# Patient Record
Sex: FEMALE | Race: WHITE | NOT HISPANIC OR LATINO | Employment: FULL TIME | URBAN - METROPOLITAN AREA
[De-identification: names, ages, dates, MRNs, and addresses within clinical notes are randomized per-mention and may not be internally consistent; named-entity substitution may affect disease eponyms.]

---

## 2018-10-21 ENCOUNTER — APPOINTMENT (EMERGENCY)
Dept: RADIOLOGY | Facility: HOSPITAL | Age: 48
End: 2018-10-21
Payer: COMMERCIAL

## 2018-10-21 ENCOUNTER — HOSPITAL ENCOUNTER (EMERGENCY)
Facility: HOSPITAL | Age: 48
Discharge: HOME | End: 2018-10-21
Attending: EMERGENCY MEDICINE | Admitting: EMERGENCY MEDICINE
Payer: COMMERCIAL

## 2018-10-21 VITALS
SYSTOLIC BLOOD PRESSURE: 140 MMHG | HEART RATE: 76 BPM | HEIGHT: 65 IN | RESPIRATION RATE: 18 BRPM | WEIGHT: 160 LBS | BODY MASS INDEX: 26.66 KG/M2 | DIASTOLIC BLOOD PRESSURE: 66 MMHG | OXYGEN SATURATION: 95 % | TEMPERATURE: 97.7 F

## 2018-10-21 DIAGNOSIS — B34.9 VIRAL ILLNESS: Primary | ICD-10-CM

## 2018-10-21 LAB
ANION GAP SERPL CALCULATED.3IONS-SCNC: 8 MMOL/L (ref 4–13)
BASOPHILS # BLD AUTO: 0 THOUSANDS/ΜL (ref 0–0.1)
BASOPHILS NFR BLD AUTO: 0 % (ref 0–2)
BUN SERPL-MCNC: 16 MG/DL (ref 7–25)
CALCIUM SERPL-MCNC: 9.7 MG/DL (ref 8.6–10.5)
CHLORIDE SERPL-SCNC: 106 MMOL/L (ref 98–107)
CO2 SERPL-SCNC: 26 MMOL/L (ref 21–31)
CREAT SERPL-MCNC: 0.9 MG/DL (ref 0.6–1.2)
EOSINOPHIL # BLD AUTO: 0.1 THOUSAND/ΜL (ref 0–0.61)
EOSINOPHIL NFR BLD AUTO: 2 % (ref 0–5)
ERYTHROCYTE [DISTWIDTH] IN BLOOD BY AUTOMATED COUNT: 13.1 % (ref 11.5–14.5)
ERYTHROCYTE [SEDIMENTATION RATE] IN BLOOD: 17 MM/HOUR (ref 0–20)
FLUAV AG SPEC QL IA: NEGATIVE
FLUBV AG SPEC QL IA: NEGATIVE
GFR SERPL CREATININE-BSD FRML MDRD: 75 ML/MIN/1.73SQ M
GLUCOSE SERPL-MCNC: 180 MG/DL (ref 65–99)
HCT VFR BLD AUTO: 43.8 % (ref 34.8–46.1)
HGB BLD-MCNC: 14.9 G/DL (ref 12–16)
LYMPHOCYTES # BLD AUTO: 1.7 THOUSANDS/ΜL (ref 0.6–4.47)
LYMPHOCYTES NFR BLD AUTO: 32 % (ref 21–51)
MCH RBC QN AUTO: 31.9 PG (ref 26–34)
MCHC RBC AUTO-ENTMCNC: 34 G/DL (ref 31–37)
MCV RBC AUTO: 94 FL (ref 81–99)
MONOCYTES # BLD AUTO: 0.3 THOUSAND/ΜL (ref 0.17–1.22)
MONOCYTES NFR BLD AUTO: 6 % (ref 2–12)
NEUTROPHILS # BLD AUTO: 3.2 THOUSANDS/ΜL (ref 1.4–6.5)
NEUTS SEG NFR BLD AUTO: 60 % (ref 42–75)
NRBC BLD AUTO-RTO: 0 /100 WBCS
PLATELET # BLD AUTO: 178 THOUSANDS/UL (ref 149–390)
PMV BLD AUTO: 9.2 FL (ref 8.6–11.7)
POTASSIUM SERPL-SCNC: 4.1 MMOL/L (ref 3.5–5.5)
RBC # BLD AUTO: 4.67 MILLION/UL (ref 3.9–5.2)
S PYO AG THROAT QL: NEGATIVE
SODIUM SERPL-SCNC: 140 MMOL/L (ref 134–143)
WBC # BLD AUTO: 5.4 THOUSAND/UL (ref 4.8–10.8)

## 2018-10-21 PROCEDURE — 85652 RBC SED RATE AUTOMATED: CPT | Performed by: EMERGENCY MEDICINE

## 2018-10-21 PROCEDURE — 85025 COMPLETE CBC W/AUTO DIFF WBC: CPT | Performed by: EMERGENCY MEDICINE

## 2018-10-21 PROCEDURE — 86140 C-REACTIVE PROTEIN: CPT | Performed by: EMERGENCY MEDICINE

## 2018-10-21 PROCEDURE — 87430 STREP A AG IA: CPT | Performed by: EMERGENCY MEDICINE

## 2018-10-21 PROCEDURE — 80048 BASIC METABOLIC PNL TOTAL CA: CPT | Performed by: EMERGENCY MEDICINE

## 2018-10-21 PROCEDURE — 99283 EMERGENCY DEPT VISIT LOW MDM: CPT

## 2018-10-21 PROCEDURE — 87631 RESP VIRUS 3-5 TARGETS: CPT | Performed by: EMERGENCY MEDICINE

## 2018-10-21 PROCEDURE — 71046 X-RAY EXAM CHEST 2 VIEWS: CPT

## 2018-10-21 PROCEDURE — 36415 COLL VENOUS BLD VENIPUNCTURE: CPT | Performed by: EMERGENCY MEDICINE

## 2018-10-21 RX ORDER — IBUPROFEN 600 MG/1
600 TABLET ORAL ONCE
Status: COMPLETED | OUTPATIENT
Start: 2018-10-21 | End: 2018-10-21

## 2018-10-21 RX ORDER — TRAMADOL HYDROCHLORIDE 50 MG/1
50 TABLET ORAL EVERY 6 HOURS PRN
Qty: 10 TABLET | Refills: 0 | Status: SHIPPED | OUTPATIENT
Start: 2018-10-21 | End: 2020-11-28 | Stop reason: HOSPADM

## 2018-10-21 RX ORDER — TRAMADOL HYDROCHLORIDE 50 MG/1
50 TABLET ORAL ONCE
Status: COMPLETED | OUTPATIENT
Start: 2018-10-21 | End: 2018-10-21

## 2018-10-21 RX ADMIN — TRAMADOL HYDROCHLORIDE 50 MG: 50 TABLET, FILM COATED ORAL at 23:24

## 2018-10-21 RX ADMIN — IBUPROFEN 600 MG: 600 TABLET ORAL at 21:22

## 2018-10-22 LAB
CRP SERPL QL: 20.8 MG/L
FLUAV AG SPEC QL: NORMAL
FLUBV AG SPEC QL: NORMAL
RSV B RNA SPEC QL NAA+PROBE: NORMAL

## 2018-10-22 NOTE — DISCHARGE INSTRUCTIONS
Viral Syndrome, Ambulatory Care   GENERAL INFORMATION:   Viral syndrome  is a term healthcare providers use for general symptoms of a viral infection that has no clear cause  Common symptoms include the following:   · Fever and chills, or a rash    · Runny or stuffy nose     · Cough, sore throat, or hoarseness     · Headache, or pain and pressure around your eyes     · Muscle aches and joint pain     · Shortness of breath or wheezing     · Abdominal pain, cramps, and diarrhea     · Nausea, vomiting, or loss of appetite  Seek immediate care for the following symptoms:   · Continued vomiting and diarrhea    · Chest pain or trouble breathing  Treatment for a viral syndrome  may include medicines to decrease fever, cough, or stuffy nose  You may also need medicines to relieve a rash, itching, or help treat the viral infection  Manage your symptoms:  Drink liquids as directed to help prevent dehydration  Ask how much liquid to drink each day and which liquids are best for you  You may need to drink an oral rehydration solution (ORS)  An ORS has the right amounts of water, salts, and sugar you need to replace body fluids  Prevent the spread of viral syndrome:   · Wash your hands often  Use soap and water  Wash your hands after you use the bathroom, change a child's diapers, or sneeze  Wash your hands before you prepare or eat food  Use a gel hand  if soap and water are not available  · Wear a mask  to help prevent spreading the virus to others  · Cook and handle food properly  Cook food completely through  Clean food preparation surfaces with a disinfectant  · Ask about vaccinations  You may need an influenza (flu) vaccine, pneumococcal vaccine, or meningococcal vaccine  These vaccines help prevent the flu, pneumonia, and meningococcal disease  Follow up with your healthcare provider as directed:  Write down your questions so you remember to ask them during your visits     CARE AGREEMENT:   You have the right to help plan your care  Learn about your health condition and how it may be treated  Discuss treatment options with your caregivers to decide what care you want to receive  You always have the right to refuse treatment  The above information is an  only  It is not intended as medical advice for individual conditions or treatments  Talk to your doctor, nurse or pharmacist before following any medical regimen to see if it is safe and effective for you  © 2014 [address] is for End User's use only and may not be sold, redistributed or otherwise used for commercial purposes  All illustrations and images included in CareNotes® are the copyrighted property of [employer]  or Timothy Hendrickson

## 2018-10-22 NOTE — ED PROVIDER NOTES
History  Chief Complaint   Patient presents with    Sore Throat    Mouth Lesions    Rash     Pt states since Friday she has had a sore throat that feels like she swallowed boiling water, a rash on her hands that burns, and blisters in the corners of her mouth  46YEAR-OLD FEMALE WITH NO HISTORY OF DISEASE PRESENTS TO THE EMERGENCY DEPARTMENT WITH THE SORE THROAT, REPORTED SORES IN HER MOUTH, MUSCLE WEAKNESS AND OVERALL SENSE OF MALAISE AND FATIGUE  STARTED 3 DAYS AGO AND SEEMS OF GOTTEN WORSE FOR THE PATIENT TODAY  SHE HAS HAD A COUGH AND OCCASIONAL DIARRHEA ASSOCIATED WITH NAUSEA  NO CHANGE IN BOWEL OR BLADDER HABITS  NO HEADACHES DIZZINESS NO REPORTED SHORTNESS OF BREATH  None       History reviewed  No pertinent past medical history  Past Surgical History:   Procedure Laterality Date    APPENDECTOMY         History reviewed  No pertinent family history  I have reviewed and agree with the history as documented  Social History   Substance Use Topics    Smoking status: Never Smoker    Smokeless tobacco: Never Used    Alcohol use No        Review of Systems   Constitutional: Negative for chills and fever  HENT: Negative for ear pain, rhinorrhea and sore throat  Eyes: Negative for pain, redness and visual disturbance  Respiratory: Negative for cough and shortness of breath  Cardiovascular: Negative for chest pain and leg swelling  Gastrointestinal: Negative for abdominal pain, diarrhea, nausea and vomiting  Genitourinary: Negative for dysuria, flank pain, frequency and urgency  Musculoskeletal: Negative for back pain, myalgias and neck pain  Skin: Negative for rash  Neurological: Negative for dizziness, weakness, light-headedness and headaches  Hematological: Negative  Psychiatric/Behavioral: Negative for agitation, confusion and suicidal ideas  The patient is not nervous/anxious  All other systems reviewed and are negative        Physical Exam  Physical Exam Constitutional: She is oriented to person, place, and time  She appears well-developed and well-nourished  HENT:   Nose: Nose normal    Mouth/Throat: Oropharynx is clear and moist  No oropharyngeal exudate  MINOR PHARYNGEAL ERYTHEMA   Eyes: Pupils are equal, round, and reactive to light  Conjunctivae and EOM are normal  No scleral icterus  Neck: Normal range of motion  Neck supple  No JVD present  No tracheal deviation present  Cardiovascular: Normal rate, regular rhythm and normal heart sounds  No murmur heard  Pulmonary/Chest: Effort normal and breath sounds normal  No respiratory distress  She has no wheezes  She has no rales  Abdominal: Soft  Bowel sounds are normal  There is no tenderness  There is no guarding  Musculoskeletal: Normal range of motion  She exhibits no edema or tenderness  Neurological: She is alert and oriented to person, place, and time  No cranial nerve deficit or sensory deficit  She exhibits normal muscle tone  5/5 motor, nl sens   Skin: Skin is warm and dry  There is erythema (MINOR ERYTHEMA TO THE PALMS OF THE HANDS WITH FAKE SPOTTING )  Psychiatric: She has a normal mood and affect  Her behavior is normal    Nursing note and vitals reviewed        Vital Signs  ED Triage Vitals [10/21/18 2047]   Temperature Pulse Respirations Blood Pressure SpO2   97 7 °F (36 5 °C) 76 18 140/66 95 %      Temp Source Heart Rate Source Patient Position - Orthostatic VS BP Location FiO2 (%)   Temporal Monitor -- Left arm --      Pain Score       7           Vitals:    10/21/18 2047   BP: 140/66   Pulse: 76       Visual Acuity      ED Medications  Medications   ibuprofen (MOTRIN) tablet 600 mg (not administered)       Diagnostic Studies  Results Reviewed     Procedure Component Value Units Date/Time    Rapid Strep A Screen Only, Adults [[id number]]     Lab Status:  No result Specimen:  Throat from Throat     Rapid Influenza Screen with Reflex PCR (indicated for patients <2 mo of age) [[id number]]     Lab Status:  No result Specimen:  Nasopharyngeal from Nasopharyngeal Swab     CBC and differential [[id number]]     Lab Status:  No result Specimen:  Blood     Basic metabolic panel [[id number]]     Lab Status:  No result Specimen:  Blood     C-reactive protein [[id number]]     Lab Status:  No result Specimen:  Blood     Sedimentation rate, automated [[id number]]     Lab Status:  No result Specimen:  Blood                  XR chest 2 views    (Results Pending)              Procedures  Procedures       Phone Contacts  ED Phone Contact    ED Course                               [facility]  CritCare Time    Disposition  Final diagnoses:   None     ED Disposition     None      Follow-up Information    None         Patient's Medications    No medications on file     No discharge procedures on file      ED Provider  Electronically Signed by           Rupesh Edwards MD  10/21/18 2122       Rupesh Edwards MD  10/22/18 9725

## 2020-11-25 ENCOUNTER — HOSPITAL ENCOUNTER (INPATIENT)
Facility: HOSPITAL | Age: 50
LOS: 3 days | Discharge: HOME | DRG: 195 | End: 2020-11-28
Attending: EMERGENCY MEDICINE | Admitting: HOSPITALIST
Payer: COMMERCIAL

## 2020-11-25 ENCOUNTER — APPOINTMENT (EMERGENCY)
Dept: RADIOLOGY | Facility: HOSPITAL | Age: 50
DRG: 195 | End: 2020-11-25
Payer: COMMERCIAL

## 2020-11-25 ENCOUNTER — APPOINTMENT (EMERGENCY)
Dept: CT IMAGING | Facility: HOSPITAL | Age: 50
DRG: 195 | End: 2020-11-25
Payer: COMMERCIAL

## 2020-11-25 DIAGNOSIS — J18.9 PNEUMONIA OF BOTH LOWER LOBES DUE TO INFECTIOUS ORGANISM: ICD-10-CM

## 2020-11-25 DIAGNOSIS — I27.20 PULMONARY HYPERTENSION (HCC): ICD-10-CM

## 2020-11-25 DIAGNOSIS — J18.9 PNEUMONIA: Primary | ICD-10-CM

## 2020-11-25 PROBLEM — R65.20 SIRS DUE TO INFECTIOUS PROCESS WITH ORGAN DYSFUNCTION (HCC): Status: ACTIVE | Noted: 2020-11-25

## 2020-11-25 PROBLEM — U07.1 COVID-19 DETERMINED BY CLINICAL DIAGNOSTIC CRITERIA: Status: ACTIVE | Noted: 2020-11-25

## 2020-11-25 PROBLEM — A41.9 SIRS DUE TO INFECTIOUS PROCESS WITH ORGAN DYSFUNCTION (HCC): Status: ACTIVE | Noted: 2020-11-25

## 2020-11-25 LAB
ALBUMIN SERPL BCP-MCNC: 4 G/DL (ref 3.5–5.7)
ALP SERPL-CCNC: 65 U/L (ref 40–150)
ALT SERPL W P-5'-P-CCNC: 44 U/L (ref 7–52)
ANION GAP SERPL CALCULATED.3IONS-SCNC: 11 MMOL/L (ref 4–13)
APTT PPP: 33 SECONDS (ref 23–37)
AST SERPL W P-5'-P-CCNC: 46 U/L (ref 13–39)
BASOPHILS # BLD AUTO: 0 THOUSANDS/ΜL (ref 0–0.1)
BASOPHILS NFR BLD AUTO: 0 % (ref 0–2)
BILIRUB SERPL-MCNC: 0.6 MG/DL (ref 0.2–1)
BUN SERPL-MCNC: 15 MG/DL (ref 7–25)
CALCIUM SERPL-MCNC: 8.5 MG/DL (ref 8.6–10.5)
CHLORIDE SERPL-SCNC: 101 MMOL/L (ref 98–107)
CO2 SERPL-SCNC: 25 MMOL/L (ref 21–31)
CREAT SERPL-MCNC: 0.77 MG/DL (ref 0.6–1.2)
D DIMER PPP FEU-MCNC: 2.02 UG/ML FEU
EOSINOPHIL # BLD AUTO: 0 THOUSAND/ΜL (ref 0–0.61)
EOSINOPHIL NFR BLD AUTO: 0 % (ref 0–5)
ERYTHROCYTE [DISTWIDTH] IN BLOOD BY AUTOMATED COUNT: 12.5 % (ref 11.5–14.5)
FLUAV RNA RESP QL NAA+PROBE: NEGATIVE
FLUBV RNA RESP QL NAA+PROBE: NEGATIVE
GFR SERPL CREATININE-BSD FRML MDRD: 89 ML/MIN/1.73SQ M
GLUCOSE SERPL-MCNC: 135 MG/DL (ref 65–99)
HCT VFR BLD AUTO: 45.6 % (ref 42–47)
HGB BLD-MCNC: 16 G/DL (ref 12–16)
INR PPP: 0.98 (ref 0.84–1.19)
LACTATE SERPL-SCNC: 0.9 MMOL/L (ref 0.5–2)
LYMPHOCYTES # BLD AUTO: 0.8 THOUSANDS/ΜL (ref 0.6–4.47)
LYMPHOCYTES NFR BLD AUTO: 19 % (ref 21–51)
MCH RBC QN AUTO: 31.9 PG (ref 26–34)
MCHC RBC AUTO-ENTMCNC: 35.1 G/DL (ref 31–37)
MCV RBC AUTO: 91 FL (ref 81–99)
MONOCYTES # BLD AUTO: 0.2 THOUSAND/ΜL (ref 0.17–1.22)
MONOCYTES NFR BLD AUTO: 5 % (ref 2–12)
NEUTROPHILS # BLD AUTO: 3.3 THOUSANDS/ΜL (ref 1.4–6.5)
NEUTS SEG NFR BLD AUTO: 77 % (ref 42–75)
PLATELET # BLD AUTO: 121 THOUSANDS/UL (ref 149–390)
PMV BLD AUTO: 8.7 FL (ref 8.6–11.7)
POTASSIUM SERPL-SCNC: 3.5 MMOL/L (ref 3.5–5.5)
PROT SERPL-MCNC: 6.9 G/DL (ref 6.4–8.9)
PROTHROMBIN TIME: 12.9 SECONDS (ref 11.6–14.5)
RBC # BLD AUTO: 5.03 MILLION/UL (ref 3.9–5.2)
RSV RNA RESP QL NAA+PROBE: NEGATIVE
SARS-COV-2 RNA RESP QL NAA+PROBE: NEGATIVE
SODIUM SERPL-SCNC: 137 MMOL/L (ref 134–143)
WBC # BLD AUTO: 4.3 THOUSAND/UL (ref 4.8–10.8)

## 2020-11-25 PROCEDURE — 83605 ASSAY OF LACTIC ACID: CPT | Performed by: EMERGENCY MEDICINE

## 2020-11-25 PROCEDURE — 96375 TX/PRO/DX INJ NEW DRUG ADDON: CPT

## 2020-11-25 PROCEDURE — 0241U HB NFCT DS VIR RESP RNA 4 TRGT: CPT | Performed by: EMERGENCY MEDICINE

## 2020-11-25 PROCEDURE — 36415 COLL VENOUS BLD VENIPUNCTURE: CPT | Performed by: EMERGENCY MEDICINE

## 2020-11-25 PROCEDURE — 85379 FIBRIN DEGRADATION QUANT: CPT | Performed by: EMERGENCY MEDICINE

## 2020-11-25 PROCEDURE — 85730 THROMBOPLASTIN TIME PARTIAL: CPT | Performed by: EMERGENCY MEDICINE

## 2020-11-25 PROCEDURE — 93005 ELECTROCARDIOGRAM TRACING: CPT

## 2020-11-25 PROCEDURE — 96361 HYDRATE IV INFUSION ADD-ON: CPT

## 2020-11-25 PROCEDURE — 71045 X-RAY EXAM CHEST 1 VIEW: CPT

## 2020-11-25 PROCEDURE — 85610 PROTHROMBIN TIME: CPT | Performed by: EMERGENCY MEDICINE

## 2020-11-25 PROCEDURE — 99285 EMERGENCY DEPT VISIT HI MDM: CPT

## 2020-11-25 PROCEDURE — 80053 COMPREHEN METABOLIC PANEL: CPT | Performed by: EMERGENCY MEDICINE

## 2020-11-25 PROCEDURE — 71275 CT ANGIOGRAPHY CHEST: CPT

## 2020-11-25 PROCEDURE — 85025 COMPLETE CBC W/AUTO DIFF WBC: CPT | Performed by: EMERGENCY MEDICINE

## 2020-11-25 PROCEDURE — 84145 PROCALCITONIN (PCT): CPT | Performed by: EMERGENCY MEDICINE

## 2020-11-25 PROCEDURE — 87040 BLOOD CULTURE FOR BACTERIA: CPT | Performed by: EMERGENCY MEDICINE

## 2020-11-25 PROCEDURE — 99223 1ST HOSP IP/OBS HIGH 75: CPT | Performed by: NURSE PRACTITIONER

## 2020-11-25 PROCEDURE — 96374 THER/PROPH/DIAG INJ IV PUSH: CPT

## 2020-11-25 PROCEDURE — G1004 CDSM NDSC: HCPCS

## 2020-11-25 PROCEDURE — 99284 EMERGENCY DEPT VISIT MOD MDM: CPT | Performed by: EMERGENCY MEDICINE

## 2020-11-25 RX ORDER — CEFTRIAXONE 1 G/50ML
1000 INJECTION, SOLUTION INTRAVENOUS ONCE
Status: COMPLETED | OUTPATIENT
Start: 2020-11-25 | End: 2020-11-25

## 2020-11-25 RX ORDER — ONDANSETRON 2 MG/ML
4 INJECTION INTRAMUSCULAR; INTRAVENOUS ONCE
Status: COMPLETED | OUTPATIENT
Start: 2020-11-25 | End: 2020-11-25

## 2020-11-25 RX ORDER — KETOROLAC TROMETHAMINE 30 MG/ML
15 INJECTION, SOLUTION INTRAMUSCULAR; INTRAVENOUS ONCE
Status: COMPLETED | OUTPATIENT
Start: 2020-11-25 | End: 2020-11-25

## 2020-11-25 RX ADMIN — IOHEXOL 100 ML: 350 INJECTION, SOLUTION INTRAVENOUS at 22:16

## 2020-11-25 RX ADMIN — KETOROLAC TROMETHAMINE 15 MG: 30 INJECTION, SOLUTION INTRAMUSCULAR; INTRAVENOUS at 20:34

## 2020-11-25 RX ADMIN — SODIUM CHLORIDE 1000 ML: 0.9 INJECTION, SOLUTION INTRAVENOUS at 20:25

## 2020-11-25 RX ADMIN — CEFTRIAXONE 1000 MG: 1 INJECTION, SOLUTION INTRAVENOUS at 23:24

## 2020-11-25 RX ADMIN — ONDANSETRON 4 MG: 2 INJECTION INTRAMUSCULAR; INTRAVENOUS at 20:27

## 2020-11-26 LAB
ALBUMIN SERPL BCP-MCNC: 3.3 G/DL (ref 3.5–5.7)
ALP SERPL-CCNC: 55 U/L (ref 40–150)
ALT SERPL W P-5'-P-CCNC: 34 U/L (ref 7–52)
ANION GAP SERPL CALCULATED.3IONS-SCNC: 11 MMOL/L (ref 4–13)
AST SERPL W P-5'-P-CCNC: 34 U/L (ref 13–39)
BACTERIA UR QL AUTO: ABNORMAL /HPF
BASOPHILS # BLD AUTO: 0 THOUSANDS/ΜL (ref 0–0.1)
BASOPHILS NFR BLD AUTO: 0 % (ref 0–2)
BILIRUB SERPL-MCNC: 0.5 MG/DL (ref 0.2–1)
BILIRUB UR QL STRIP: ABNORMAL
BUN SERPL-MCNC: 14 MG/DL (ref 7–25)
CALCIUM ALBUM COR SERPL-MCNC: 8.3 MG/DL (ref 8.3–10.1)
CALCIUM SERPL-MCNC: 7.7 MG/DL (ref 8.6–10.5)
CHLORIDE SERPL-SCNC: 105 MMOL/L (ref 98–107)
CLARITY UR: CLEAR
CO2 SERPL-SCNC: 19 MMOL/L (ref 21–31)
COLOR UR: YELLOW
CREAT SERPL-MCNC: 0.67 MG/DL (ref 0.6–1.2)
CRP SERPL QL: 59.2 MG/L
EOSINOPHIL # BLD AUTO: 0 THOUSAND/ΜL (ref 0–0.61)
EOSINOPHIL NFR BLD AUTO: 0 % (ref 0–5)
ERYTHROCYTE [DISTWIDTH] IN BLOOD BY AUTOMATED COUNT: 12.7 % (ref 11.5–14.5)
FERRITIN SERPL-MCNC: 591 NG/ML (ref 8–388)
FLUAV RNA RESP QL NAA+PROBE: NEGATIVE
FLUBV RNA RESP QL NAA+PROBE: NEGATIVE
GFR SERPL CREATININE-BSD FRML MDRD: 101 ML/MIN/1.73SQ M
GLUCOSE SERPL-MCNC: 219 MG/DL (ref 65–99)
GLUCOSE UR STRIP-MCNC: NEGATIVE MG/DL
HCT VFR BLD AUTO: 41 % (ref 42–47)
HGB BLD-MCNC: 14.4 G/DL (ref 12–16)
HGB UR QL STRIP.AUTO: NEGATIVE
KETONES UR STRIP-MCNC: ABNORMAL MG/DL
L PNEUMO1 AG UR QL IA.RAPID: NEGATIVE
LEUKOCYTE ESTERASE UR QL STRIP: NEGATIVE
LYMPHOCYTES # BLD AUTO: 0.8 THOUSANDS/ΜL (ref 0.6–4.47)
LYMPHOCYTES NFR BLD AUTO: 21 % (ref 21–51)
MAGNESIUM SERPL-MCNC: 2.1 MG/DL (ref 1.9–2.7)
MCH RBC QN AUTO: 31.7 PG (ref 26–34)
MCHC RBC AUTO-ENTMCNC: 35.1 G/DL (ref 31–37)
MCV RBC AUTO: 90 FL (ref 81–99)
MONOCYTES # BLD AUTO: 0.1 THOUSAND/ΜL (ref 0.17–1.22)
MONOCYTES NFR BLD AUTO: 3 % (ref 2–12)
MUCOUS THREADS UR QL AUTO: ABNORMAL
NEUTROPHILS # BLD AUTO: 3 THOUSANDS/ΜL (ref 1.4–6.5)
NEUTS SEG NFR BLD AUTO: 76 % (ref 42–75)
NITRITE UR QL STRIP: NEGATIVE
NON-SQ EPI CELLS URNS QL MICRO: ABNORMAL /HPF
PH UR STRIP.AUTO: 6.5 [PH]
PLATELET # BLD AUTO: 124 THOUSANDS/UL (ref 149–390)
PMV BLD AUTO: 8.9 FL (ref 8.6–11.7)
POTASSIUM SERPL-SCNC: 3.9 MMOL/L (ref 3.5–5.5)
PROCALCITONIN SERPL-MCNC: 0.22 NG/ML
PROCALCITONIN SERPL-MCNC: 0.43 NG/ML
PROT SERPL-MCNC: 6 G/DL (ref 6.4–8.9)
PROT UR STRIP-MCNC: ABNORMAL MG/DL
RBC # BLD AUTO: 4.54 MILLION/UL (ref 3.9–5.2)
RBC #/AREA URNS AUTO: ABNORMAL /HPF
RSV RNA RESP QL NAA+PROBE: NEGATIVE
S PNEUM AG UR QL: NEGATIVE
SARS-COV-2 RNA RESP QL NAA+PROBE: NEGATIVE
SODIUM SERPL-SCNC: 135 MMOL/L (ref 134–143)
SP GR UR STRIP.AUTO: 1.01 (ref 1–1.03)
UROBILINOGEN UR QL STRIP.AUTO: 1 E.U./DL
WBC # BLD AUTO: 3.9 THOUSAND/UL (ref 4.8–10.8)
WBC #/AREA URNS AUTO: ABNORMAL /HPF

## 2020-11-26 PROCEDURE — 99232 SBSQ HOSP IP/OBS MODERATE 35: CPT | Performed by: STUDENT IN AN ORGANIZED HEALTH CARE EDUCATION/TRAINING PROGRAM

## 2020-11-26 PROCEDURE — 83735 ASSAY OF MAGNESIUM: CPT | Performed by: NURSE PRACTITIONER

## 2020-11-26 PROCEDURE — 86140 C-REACTIVE PROTEIN: CPT | Performed by: STUDENT IN AN ORGANIZED HEALTH CARE EDUCATION/TRAINING PROGRAM

## 2020-11-26 PROCEDURE — 81003 URINALYSIS AUTO W/O SCOPE: CPT | Performed by: NURSE PRACTITIONER

## 2020-11-26 PROCEDURE — 81001 URINALYSIS AUTO W/SCOPE: CPT | Performed by: NURSE PRACTITIONER

## 2020-11-26 PROCEDURE — 94664 DEMO&/EVAL PT USE INHALER: CPT

## 2020-11-26 PROCEDURE — 0241U HB NFCT DS VIR RESP RNA 4 TRGT: CPT | Performed by: NURSE PRACTITIONER

## 2020-11-26 PROCEDURE — 94760 N-INVAS EAR/PLS OXIMETRY 1: CPT

## 2020-11-26 PROCEDURE — 85025 COMPLETE CBC W/AUTO DIFF WBC: CPT | Performed by: NURSE PRACTITIONER

## 2020-11-26 PROCEDURE — 87449 NOS EACH ORGANISM AG IA: CPT | Performed by: NURSE PRACTITIONER

## 2020-11-26 PROCEDURE — 80053 COMPREHEN METABOLIC PANEL: CPT | Performed by: NURSE PRACTITIONER

## 2020-11-26 PROCEDURE — 82728 ASSAY OF FERRITIN: CPT | Performed by: STUDENT IN AN ORGANIZED HEALTH CARE EDUCATION/TRAINING PROGRAM

## 2020-11-26 PROCEDURE — 84145 PROCALCITONIN (PCT): CPT | Performed by: EMERGENCY MEDICINE

## 2020-11-26 RX ORDER — DEXAMETHASONE SODIUM PHOSPHATE 4 MG/ML
6 INJECTION, SOLUTION INTRA-ARTICULAR; INTRALESIONAL; INTRAMUSCULAR; INTRAVENOUS; SOFT TISSUE EVERY 24 HOURS
Status: DISCONTINUED | OUTPATIENT
Start: 2020-11-26 | End: 2020-11-28 | Stop reason: HOSPADM

## 2020-11-26 RX ORDER — GUAIFENESIN 600 MG
600 TABLET, EXTENDED RELEASE 12 HR ORAL 2 TIMES DAILY
Status: DISCONTINUED | OUTPATIENT
Start: 2020-11-26 | End: 2020-11-27

## 2020-11-26 RX ORDER — CEFTRIAXONE 1 G/50ML
1000 INJECTION, SOLUTION INTRAVENOUS EVERY 24 HOURS
Status: DISCONTINUED | OUTPATIENT
Start: 2020-11-26 | End: 2020-11-26

## 2020-11-26 RX ORDER — MULTIVITAMIN/IRON/FOLIC ACID 18MG-0.4MG
1 TABLET ORAL DAILY
Status: DISCONTINUED | OUTPATIENT
Start: 2020-12-03 | End: 2020-11-28 | Stop reason: HOSPADM

## 2020-11-26 RX ORDER — CEFEPIME HYDROCHLORIDE 2 G/50ML
2000 INJECTION, SOLUTION INTRAVENOUS EVERY 12 HOURS
Status: DISCONTINUED | OUTPATIENT
Start: 2020-11-26 | End: 2020-11-28 | Stop reason: HOSPADM

## 2020-11-26 RX ORDER — AZITHROMYCIN 250 MG/1
500 TABLET, FILM COATED ORAL EVERY 24 HOURS
Status: DISCONTINUED | OUTPATIENT
Start: 2020-11-26 | End: 2020-11-26

## 2020-11-26 RX ORDER — ACETAMINOPHEN 325 MG/1
650 TABLET ORAL EVERY 6 HOURS PRN
Status: DISCONTINUED | OUTPATIENT
Start: 2020-11-26 | End: 2020-11-28 | Stop reason: HOSPADM

## 2020-11-26 RX ORDER — ZINC SULFATE 50(220)MG
220 CAPSULE ORAL DAILY
Status: DISCONTINUED | OUTPATIENT
Start: 2020-11-26 | End: 2020-11-28 | Stop reason: HOSPADM

## 2020-11-26 RX ORDER — ASCORBIC ACID 500 MG
1000 TABLET ORAL EVERY 12 HOURS SCHEDULED
Status: DISCONTINUED | OUTPATIENT
Start: 2020-11-26 | End: 2020-11-28 | Stop reason: HOSPADM

## 2020-11-26 RX ORDER — ONDANSETRON 2 MG/ML
4 INJECTION INTRAMUSCULAR; INTRAVENOUS EVERY 6 HOURS PRN
Status: DISCONTINUED | OUTPATIENT
Start: 2020-11-26 | End: 2020-11-28 | Stop reason: HOSPADM

## 2020-11-26 RX ORDER — SODIUM CHLORIDE AND POTASSIUM CHLORIDE .9; .15 G/100ML; G/100ML
100 SOLUTION INTRAVENOUS CONTINUOUS
Status: DISCONTINUED | OUTPATIENT
Start: 2020-11-26 | End: 2020-11-27

## 2020-11-26 RX ORDER — MELATONIN
2000 DAILY
Status: DISCONTINUED | OUTPATIENT
Start: 2020-11-26 | End: 2020-11-28 | Stop reason: HOSPADM

## 2020-11-26 RX ADMIN — ENOXAPARIN SODIUM 30 MG: 30 INJECTION SUBCUTANEOUS at 00:23

## 2020-11-26 RX ADMIN — DEXAMETHASONE SODIUM PHOSPHATE 6 MG: 4 INJECTION, SOLUTION INTRA-ARTICULAR; INTRALESIONAL; INTRAMUSCULAR; INTRAVENOUS; SOFT TISSUE at 00:23

## 2020-11-26 RX ADMIN — ENOXAPARIN SODIUM 30 MG: 30 INJECTION SUBCUTANEOUS at 20:17

## 2020-11-26 RX ADMIN — CEFEPIME HYDROCHLORIDE 2000 MG: 2 INJECTION, SOLUTION INTRAVENOUS at 11:00

## 2020-11-26 RX ADMIN — ONDANSETRON 4 MG: 2 INJECTION INTRAMUSCULAR; INTRAVENOUS at 22:03

## 2020-11-26 RX ADMIN — FAMOTIDINE 20 MG: 10 INJECTION, SOLUTION INTRAVENOUS at 09:17

## 2020-11-26 RX ADMIN — FAMOTIDINE 20 MG: 10 INJECTION, SOLUTION INTRAVENOUS at 17:24

## 2020-11-26 RX ADMIN — ACETAMINOPHEN 650 MG: 325 TABLET ORAL at 20:52

## 2020-11-26 RX ADMIN — GUAIFENESIN 600 MG: 600 TABLET, EXTENDED RELEASE ORAL at 17:24

## 2020-11-26 RX ADMIN — ZINC SULFATE 220 MG (50 MG) CAPSULE 220 MG: CAPSULE at 09:17

## 2020-11-26 RX ADMIN — GUAIFENESIN 600 MG: 600 TABLET, EXTENDED RELEASE ORAL at 09:17

## 2020-11-26 RX ADMIN — Medication 2000 UNITS: at 09:17

## 2020-11-26 RX ADMIN — SODIUM CHLORIDE AND POTASSIUM CHLORIDE 100 ML/HR: .9; .15 SOLUTION INTRAVENOUS at 00:23

## 2020-11-26 RX ADMIN — Medication 500 MG: at 00:33

## 2020-11-26 RX ADMIN — OXYCODONE HYDROCHLORIDE AND ACETAMINOPHEN 1000 MG: 500 TABLET ORAL at 20:17

## 2020-11-26 RX ADMIN — ENOXAPARIN SODIUM 30 MG: 30 INJECTION SUBCUTANEOUS at 09:20

## 2020-11-26 RX ADMIN — CEFEPIME HYDROCHLORIDE 2000 MG: 2 INJECTION, SOLUTION INTRAVENOUS at 22:03

## 2020-11-26 RX ADMIN — OXYCODONE HYDROCHLORIDE AND ACETAMINOPHEN 1000 MG: 500 TABLET ORAL at 00:23

## 2020-11-26 NOTE — ASSESSMENT & PLAN NOTE
· Multi focal pneumonia on both chest x-ray and CTA PE study  · Patient's initial COVID test was negative - patient appears to be COVID positive by assessment  · Will recheck COVID test in a m    · Follow mild treatment plan for COVID positive symptoms  · Continue azithromycin and Rocephin  · Aspiration precautions  · Elevate head of bed  · Respiratory protocol  · Incentive spirometer  · Sputum culture  · Urine for Legionella and strep  · Mucinex

## 2020-11-26 NOTE — NURSING NOTE
Patient resting comfortably in bed, denies any pain or discomfort at this time  Lungs clear and decreased at the bases, pulse ox 93 % on RA, No S&S of distress  Will continue to monitor

## 2020-11-26 NOTE — ASSESSMENT & PLAN NOTE
· Low-grade temperature, tachycardia, tachypnea, patient requiring oxygen for 95% saturation  · Patient with elevated D-dimer of 2 02  · Procalcitonin pending  · Urinalysis pending  · Continue antibiotics and IV fluids

## 2020-11-26 NOTE — ASSESSMENT & PLAN NOTE
· Will follow mild treatment algorithm  · Patient not started on remdesivir  · Initial test was negative  · Patient presents with suspected signs and symptoms  · Will retest in a m

## 2020-11-26 NOTE — ASSESSMENT & PLAN NOTE
· Presumed COVID positive despite having negative test result  · Ferritin elevated, D-dimer elevated but CTA PE study negative for PE  · Started on mild COVID protocol order set, continue with antibiotics and steroids  · Given concern for multifocal pneumonia, will broaden ceftriaxone to cefepime for pseudomonal coverage, continue azithromycin  · Trend CRP, ferritin, inflammatory markers  · Continue with supportive care

## 2020-11-26 NOTE — UTILIZATION REVIEW
Initial Clinical Review    Admission: Date/Time/Statement:   Admission Orders (From admission, onward)     Ordered        11/25/20 2324  Inpatient Admission  Once                   Orders Placed This Encounter   Procedures    Inpatient Admission     Standing Status:   Standing     Number of Occurrences:   1     Order Specific Question:   Admitting Physician     Answer:   Megan Elizabeth [[id number]]     Order Specific Question:   Level of Care     Answer:   Med Surg [[id number]]     Order Specific Question:   Bed request comments     Answer:   tele     Order Specific Question:   Estimated length of stay     Answer:   More than 2 Midnights     Order Specific Question:   Certification     Answer:   I certify that inpatient services are medically necessary for this patient for a duration of greater than two midnights  See H&P and MD Progress Notes for additional information about the patient's course of treatment  ED Arrival Information     Expected Arrival Acuity Means of Arrival Escorted By Service Admission Type    - 11/25/2020 17:31 Urgent Walk-In Self General Medicine Urgent    Arrival Complaint    weak, vomiting, fever        Chief Complaint   Patient presents with    Vomiting     pt presents with cough, vomiting, diarrhea, chills, generalized body pain, and headache x1 weeks  Assessment/Plan:  this is a 44year old female from home to ED admitted inpatient due to multi focal pneumonia/COVID 19 by clinical diagnosis with  negative test/SIRS  Presented with cough, vomiting, diarrhea and malaise starting about 1 week prior to arrival    On exam appears ill  UA 3+ ketones  1+ protein  D dimer 2 02  Wbc 4 30  platelets 093  cta chest showed multifocal pneumonia  In the ED given 1 liter IVF, Zofran, Toradol, Rocephin    Plan is continued antibiotics, IVF, mild treatment algorithm for COVID including: vitamin C, D3, atorvastatin, Decadron IV, Lovenox, zinc      On 11/26/2020  Has shortness of breath, feels no better than yesterday  Antibiotics, IVF and COVID treatment continue  Oxygen started  ED Triage Vitals   Temperature Pulse Respirations Blood Pressure SpO2   11/25/20 1913 11/25/20 1913 11/25/20 1913 11/25/20 1913 11/25/20 1913   99 2 °F (37 3 °C) 102 22 137/68 95 %      Temp Source Heart Rate Source Patient Position - Orthostatic VS BP Location FiO2 (%)   11/25/20 1913 11/25/20 1913 11/25/20 2030 11/25/20 1913 --   Temporal Monitor Lying Right arm       Pain Score       11/25/20 1913       7          Wt Readings from Last 1 Encounters:   11/26/20 75 1 kg (165 lb 9 1 oz)     Additional Vital Signs:   11/26/20 1100  98 3 °F (36 8 °C)  72  16  130/80  --  95 %  28  2 L/min  Nasal cannula  Sitting   11/26/20 0550  98 9 °F (37 2 °C)  73  18  122/63  86  93 %  28  2 L/min  Nasal cannula  Lying   11/26/20 0300  98 6 °F (37 °C)  78  20  120/58  --  93 %  28  2 L/min  Nasal cannula  Lying   11/26/20 0035  --  80  20  --  --  93 %  28  2 L/min  Nasal cannula  --   11/26/20 0000  99 3 °F (37 4 °C)  81  20  117/75  --  95 %  28  2 L/min  Nasal cannula  Lying   11/25/20 2325  --  80  20  90/56  68  94 %  --  --  None (Room air)  Lying   11/25/20 2130  --  84  20  118/56  81  93 %  --  --  None (Room air)         Pertinent Labs/Diagnostic Test Results:   11/25/2020 cta chest Multifocal pneumonia  Enlarged pulmonary artery suggesting pulmonary hypertension  No evidence of pulmonary embolism  Hepatic steatosis    11/25/2020 CxR  Multifocal pneumonia        Results from last 7 days   Lab Units 11/26/20  0615 11/25/20 2142   SARS-COV-2  Negative Negative     Results from last 7 days   Lab Units 11/26/20  0548 11/25/20 2011   WBC Thousand/uL 3 90* 4 30*   HEMOGLOBIN g/dL 14 4 16 0   HEMATOCRIT % 41 0* 45 6   PLATELETS Thousands/uL 124* 121*   NEUTROS ABS Thousands/µL 3 00 3 30     Results from last 7 days   Lab Units 11/26/20  0547 11/25/20 2011   SODIUM mmol/L 135 137   POTASSIUM mmol/L 3 9 3 5   CHLORIDE mmol/L 105 101   CO2 mmol/L 19* 25   ANION GAP mmol/L 11 11   BUN mg/dL 14 15   CREATININE mg/dL 0 67 0 77   EGFR ml/min/1 73sq m 101 89   CALCIUM mg/dL 7 7* 8 5*   MAGNESIUM mg/dL 2 1  --      Results from last 7 days   Lab Units 11/26/20  0547 11/25/20 2011   AST U/L 34 46*   ALT U/L 34 44   ALK PHOS U/L 55 65   TOTAL PROTEIN g/dL 6 0* 6 9   ALBUMIN g/dL 3 3* 4 0   TOTAL BILIRUBIN mg/dL 0 50 0 60     Results from last 7 days   Lab Units 11/26/20  0547 11/25/20 2011   GLUCOSE RANDOM mg/dL 219* 135*     Results from last 7 days   Lab Units 11/25/20 2011   D-DIMER QUANTITATIVE ug/ml FEU 2 02*     Results from last 7 days   Lab Units 11/25/20 2011   PROTIME seconds 12 9   INR  0 98   PTT seconds 33     Results from last 7 days   Lab Units 11/25/20 2011   PROCALCITONIN ng/ml 0 22     Results from last 7 days   Lab Units 11/25/20 2011   LACTIC ACID mmol/L 0 9     Results from last 7 days   Lab Units 11/26/20  0123   CLARITY UA  Clear   COLOR UA  Yellow   SPEC GRAV UA  1 015   PH UA  6 5   GLUCOSE UA mg/dl Negative   KETONES UA mg/dl 80 (3+)*   BLOOD UA  Negative   PROTEIN UA mg/dl 1+*   NITRITE UA  Negative   BILIRUBIN UA  1+*   UROBILINOGEN UA E U /dl 1 0   LEUKOCYTES UA  Negative   WBC UA /hpf 1-2   RBC UA /hpf 0-1   BACTERIA UA /hpf Occasional   EPITHELIAL CELLS WET PREP /hpf Moderate*   MUCUS THREADS  Moderate*     Results from last 7 days   Lab Units 11/26/20  0615 11/25/20  2142   INFLUENZA A PCR  Negative Negative   INFLUENZA B PCR  Negative Negative   RSV PCR  Negative Negative     Results from last 7 days   Lab Units 11/25/20 2011   BLOOD CULTURE  Received in Microbiology Lab  Culture in Progress  Received in Microbiology Lab  Culture in Progress         ED Treatment:   Medication Administration from 11/25/2020 1730 to 11/26/2020 0002       Date/Time Order Dose Route Action Comments     11/25/2020 2025 sodium chloride 0 9 % bolus 1,000 mL 1,000 mL Intravenous New Bag      11/25/2020 2027 ondansetron (Jovita Ulloa) injection 4 mg 4 mg Intravenous Given      11/25/2020 2034 ketorolac (TORADOL) injection 15 mg 15 mg Intravenous Given      11/25/2020 2324 cefTRIAXone (ROCEPHIN) IVPB (premix in dextrose) 1,000 mg 50 mL 1,000 mg Intravenous New Bag         History reviewed  No pertinent past medical history  Present on Admission:   Pneumonia of both lower lobes due to infectious organism   COVID-19 determined by clinical diagnostic criteria   SIRS due to infectious process with organ dysfunction [facility])      Admitting Diagnosis: Pneumonia [J18 9]  Age/Sex: 44 y o  female  Admission Orders: 11/25/2020 2324 inpatient   Scheduled Medications:  ascorbic acid, 1,000 mg, Oral, Q12H NATY  azithromycin, 500 mg, Oral, Q24H  cefepime, 2,000 mg, Intravenous, Q12H  cholecalciferol, 2,000 Units, Oral, Daily  dexamethasone, 6 mg, Intravenous, Q24H  enoxaparin, 30 mg, Subcutaneous, Q12H NATY  famotidine, 20 mg, Intravenous, BID  guaiFENesin, 600 mg, Oral, BID  zinc sulfate, 220 mg, Oral, Daily    Followed by  Maci Sow ON 12/3/2020] multivitamin-minerals, 1 tablet, Oral, Daily      Continuous IV Infusions:  sodium chloride 0 9 % with KCl 20 mEq/L, 100 mL/hr, Intravenous, Continuous      PRN Meds:  acetaminophen, 650 mg, Oral, Q6H PRN  ondansetron, 4 mg, Intravenous, Q6H PRN    Telemetry  Clears   Contact and airborne isolation      Network Utilization Review Department  [email or web address]  ATTENTION: Please call with any questions or concerns to [phone] and carefully listen to the prompts so that you are directed to the right person  All voicemails are confidential   Ellen Perez all requests for admission clinical reviews, approved or denied determinations and any other requests to dedicated fax number below belonging to the campus where the patient is receiving treatment   List of dedicated fax numbers for the Facilities:  FACILITY NAME UR FAX NUMBER   ADMISSION DENIALS (Administrative/Medical Necessity) [phone]   [facility] [address] (Maternity/NICU/Pediatrics) [place] [phone]   Bridget Powell [phone]   Lio Albion [phone]   Willie Warren [address] [phone]   [facility]  [phone]   [address] [facility]  [address] [facility] [address] [facility] [phone]

## 2020-11-26 NOTE — PLAN OF CARE
Problem: Potential for Falls  Goal: Patient will remain free of falls  Description: INTERVENTIONS:  - Assess patient frequently for physical needs  -  Identify cognitive and physical deficits and behaviors that affect risk of falls    -  [place] fall precautions as indicated by assessment   - Educate patient/family on patient safety including physical limitations  - Instruct patient to call for assistance with activity based on assessment  - Modify environment to reduce risk of injury  - Consider OT/PT consult to assist with strengthening/mobility  11/26/2020 0330 by Shanika Benitez RN  Outcome: Progressing  11/26/2020 0329 by Shanika Benitez RN  Outcome: Progressing     Problem: PAIN - ADULT  Goal: Verbalizes/displays adequate comfort level or baseline comfort level  Description: Interventions:  - Encourage patient to monitor pain and request assistance  - Assess pain using appropriate pain scale  - Administer analgesics based on type and severity of pain and evaluate response  - Implement non-pharmacological measures as appropriate and evaluate response  - Consider cultural and social influences on pain and pain management  - Notify physician/advanced practitioner if interventions unsuccessful or patient reports new pain  Outcome: Progressing     Problem: INFECTION - ADULT  Goal: Absence or prevention of progression during hospitalization  Description: INTERVENTIONS:  - Assess and monitor for signs and symptoms of infection  - Monitor lab/diagnostic results  - Monitor all insertion sites, i e  indwelling lines, tubes, and drains  - Monitor endotracheal if appropriate and nasal secretions for changes in amount and color  - [place] appropriate cooling/warming therapies per order  - Administer medications as ordered  - Instruct and encourage patient and family to use good hand hygiene technique  - Identify and instruct in appropriate isolation precautions for identified infection/condition  Outcome: Progressing  Goal: Absence of fever/infection during neutropenic period  Description: INTERVENTIONS:  - Monitor WBC    Outcome: Progressing     Problem: SAFETY ADULT  Goal: Patient will remain free of falls  Description: INTERVENTIONS:  - Assess patient frequently for physical needs  -  Identify cognitive and physical deficits and behaviors that affect risk of falls    -  [place] fall precautions as indicated by assessment   - Educate patient/family on patient safety including physical limitations  - Instruct patient to call for assistance with activity based on assessment  - Modify environment to reduce risk of injury  - Consider OT/PT consult to assist with strengthening/mobility  11/26/2020 0330 by Samantha Amador RN  Outcome: Progressing  11/26/2020 0329 by Samantha Amador RN  Outcome: Progressing  Goal: Maintain or return to baseline ADL function  Description: INTERVENTIONS:  -  Assess patient's ability to carry out ADLs; assess patient's baseline for ADL function and identify physical deficits which impact ability to perform ADLs (bathing, care of mouth/teeth, toileting, grooming, dressing, etc )  - Assess/evaluate cause of self-care deficits   - Assess range of motion  - Assess patient's mobility; develop plan if impaired  - Assess patient's need for assistive devices and provide as appropriate  - Encourage maximum independence but intervene and supervise when necessary  - Involve family in performance of ADLs  - Assess for home care needs following discharge   - Consider OT consult to assist with ADL evaluation and planning for discharge  - Provide patient education as appropriate  Outcome: Progressing  Goal: Maintain or return mobility status to optimal level  Description: INTERVENTIONS:  - Assess patient's baseline mobility status (ambulation, transfers, stairs, etc )    - Identify cognitive and physical deficits and behaviors that affect mobility  - Identify mobility aids required to assist with transfers and/or ambulation (gait belt, sit-to-stand, lift, walker, cane, etc )  - [place] fall precautions as indicated by assessment  - Record patient progress and toleration of activity level on Mobility SBAR; progress patient to next Phase/Stage  - Instruct patient to call for assistance with activity based on assessment  - Consider rehabilitation consult to assist with strengthening/weightbearing, etc   Outcome: Progressing     Problem: DISCHARGE PLANNING  Goal: Discharge to home or other facility with appropriate resources  Description: INTERVENTIONS:  - Identify barriers to discharge w/patient and caregiver  - Arrange for needed discharge resources and transportation as appropriate  - Identify discharge learning needs (meds, wound care, etc )  - Arrange for interpretive services to assist at discharge as needed  - Refer to Case Management Department for coordinating discharge planning if the patient needs post-hospital services based on physician/advanced practitioner order or complex needs related to functional status, cognitive ability, or social support system  Outcome: Progressing     Problem: Knowledge Deficit  Goal: Patient/family/caregiver demonstrates understanding of disease process, treatment plan, medications, and discharge instructions  Description: Complete learning assessment and assess knowledge base    Interventions:  - Provide teaching at level of understanding  - Provide teaching via preferred learning methods  Outcome: Progressing

## 2020-11-26 NOTE — ED PROVIDER NOTES
History  Chief Complaint   Patient presents with    Vomiting     pt presents with cough, vomiting, diarrhea, chills, generalized body pain, and headache x1 weeks  This is a 49-year-old female who complains of cough, vomiting, diarrhea, generalized malaise  She reports symptoms have been present for the last week  She is not having difficulty staying hydrated  She also reports that she is having increasing shortness of breath  Patient took Tylenol or of a period reports she never had anything like this before  Vomiting nonbloody nonbilious  Mucus in diarrhea which is watery diffuse  Prior to Admission Medications   Prescriptions Last Dose Informant Patient Reported? Taking?   traMADol (ULTRAM) 50 mg tablet Not Taking at Unknown time  No No   Sig: Take 1 tablet (50 mg total) by mouth every 6 (six) hours as needed for moderate pain for up to 10 doses   Patient not taking: Reported on 11/26/2020      Facility-Administered Medications: None       History reviewed  No pertinent past medical history  Past Surgical History:   Procedure Laterality Date    APPENDECTOMY         History reviewed  No pertinent family history  I have reviewed and agree with the history as documented  E-Cigarette/Vaping    E-Cigarette Use Never User      E-Cigarette/Vaping Substances     Social History     Tobacco Use    Smoking status: Never Smoker    Smokeless tobacco: Never Used   Substance Use Topics    Alcohol use: Never    Drug use: No       Review of Systems   Constitutional: Positive for activity change, chills, fatigue and fever  HENT: Positive for congestion and rhinorrhea  Eyes: Negative for visual disturbance  Respiratory: Positive for cough and shortness of breath  Cardiovascular: Negative for chest pain, palpitations and leg swelling  Gastrointestinal: Positive for diarrhea, nausea and vomiting  Negative for abdominal pain  Endocrine: Negative for polyuria     Skin: Negative for rash and wound  Allergic/Immunologic: Negative for immunocompromised state  Physical Exam  Physical Exam  Vitals signs and nursing note reviewed  Constitutional:       Appearance: She is well-developed  She is ill-appearing  HENT:      Head: Normocephalic and atraumatic  Eyes:      Conjunctiva/sclera: Conjunctivae normal    Neck:      Musculoskeletal: Neck supple  Cardiovascular:      Rate and Rhythm: Normal rate and regular rhythm  Heart sounds: No murmur  Pulmonary:      Effort: Pulmonary effort is normal  No respiratory distress  Breath sounds: No stridor  Rhonchi present  No wheezing or rales  Comments: Occasionally coughing  Abdominal:      General: There is no distension  Palpations: Abdomen is soft  Tenderness: There is no abdominal tenderness  There is no guarding or rebound  Skin:     General: Skin is warm and dry  Neurological:      General: No focal deficit present  Mental Status: She is alert and oriented to person, place, and time  Psychiatric:         Mood and Affect: Mood normal          Behavior: Behavior normal          Thought Content:  Thought content normal          Judgment: Judgment normal          Vital Signs  ED Triage Vitals   Temperature Pulse Respirations Blood Pressure SpO2   11/25/20 1913 11/25/20 1913 11/25/20 1913 11/25/20 1913 11/25/20 1913   99 2 °F (37 3 °C) 102 22 137/68 95 %      Temp Source Heart Rate Source Patient Position - Orthostatic VS BP Location FiO2 (%)   11/25/20 1913 11/25/20 1913 11/25/20 2030 11/25/20 1913 --   Temporal Monitor Lying Right arm       Pain Score       11/25/20 1913       7           Vitals:    11/26/20 0035 11/26/20 0300 11/26/20 0550 11/26/20 1100   BP:  120/58 122/63 130/80   Pulse: 80 78 73 72   Patient Position - Orthostatic VS:  Lying Lying Sitting         Visual Acuity      ED Medications  Medications   sodium chloride 0 9 % with KCl 20 mEq/L infusion (premix) (100 mL/hr Intravenous New Bag 11/26/20 0023)   acetaminophen (TYLENOL) tablet 650 mg (has no administration in time range)   ondansetron (ZOFRAN) injection 4 mg (has no administration in time range)   guaiFENesin (MUCINEX) 12 hr tablet 600 mg (600 mg Oral Given 11/26/20 0917)   azithromycin (ZITHROMAX) tablet 500 mg (has no administration in time range)   cholecalciferol (VITAMIN D3) tablet 2,000 Units (2,000 Units Oral Given 11/26/20 0917)   ascorbic acid (VITAMIN C) tablet 1,000 mg (1,000 mg Oral Given 11/26/20 0919)   zinc sulfate (ZINCATE) capsule 220 mg (220 mg Oral Given 11/26/20 0917)     Followed by   multivitamin-minerals (CENTRUM ADULTS) tablet 1 tablet (has no administration in time range)   dexamethasone (DECADRON) injection 6 mg (6 mg Intravenous Given 11/26/20 0023)   famotidine (PEPCID) injection 20 mg (20 mg Intravenous Given 11/26/20 0917)   enoxaparin (LOVENOX) subcutaneous injection 30 mg (30 mg Subcutaneous Given 11/26/20 0920)   cefepime (MAXIPIME) IVPB (premix in dextrose) 2,000 mg 50 mL (2,000 mg Intravenous New Bag 11/26/20 1100)   sodium chloride 0 9 % bolus 1,000 mL (0 mL Intravenous Stopped 11/25/20 2225)   ondansetron (ZOFRAN) injection 4 mg (4 mg Intravenous Given 11/25/20 2027)   ketorolac (TORADOL) injection 15 mg (15 mg Intravenous Given 11/25/20 2034)   iohexol (OMNIPAQUE) 350 MG/ML injection (MULTI-DOSE) 100 mL (100 mL Intravenous Given 11/25/20 2216)   cefTRIAXone (ROCEPHIN) IVPB (premix in dextrose) 1,000 mg 50 mL (1,000 mg Intravenous New Bag 11/25/20 2324)   azithromycin (ZITHROMAX) 500 mg in sodium chloride 0 9% 250mL IVPB 500 mg (500 mg Intravenous New Bag 11/26/20 0033)       Diagnostic Studies  Results Reviewed     Procedure Component Value Units Date/Time    Blood culture #1 [[id number]] Collected: 11/25/20 2011    Lab Status: Preliminary result Specimen: Blood from Arm, Left Updated: 11/26/20 0201     Blood Culture Received in Microbiology Lab  Culture in Progress      Blood culture #2 [[id number]] Collected: 11/25/20 2011    Lab Status: Preliminary result Specimen: Blood from Arm, Left Updated: 11/26/20 0201     Blood Culture Received in Microbiology Lab  Culture in Progress  Procalcitonin with AM Reflex [[id number]]  (Normal) Collected: 11/25/20 2011    Lab Status: Final result Specimen: Blood from Arm, Left Updated: 11/26/20 0149     Procalcitonin 0 22 ng/ml     UA w Reflex to Microscopic w Reflex to Culture [[id number]]  (Abnormal) Collected: 11/26/20 0123    Lab Status: Final result Specimen: Urine, Clean Catch Updated: 11/26/20 0131     Color, UA Yellow     Clarity, UA Clear     Specific Gravity, UA 1 015     pH, UA 6 5     Leukocytes, UA Negative     Nitrite, UA Negative     Protein, UA 1+ mg/dl      Glucose, UA Negative mg/dl      Ketones, UA 80 (3+) mg/dl      Urobilinogen, UA 1 0 E U /dl      Bilirubin, UA 1+     Blood, UA Negative    COVID19, Influenza A/B, RSV PCR, [facility] [[id number]]  (Normal) Collected: 11/25/20 2142    Lab Status: Final result Specimen: Nares from Nasopharyngeal Swab Updated: 11/25/20 2229     SARS-CoV-2 Negative     INFLUENZA A PCR Negative     INFLUENZA B PCR Negative     RSV PCR Negative    Narrative: This test has been authorized by FDA under an EUA (Emergency Use Assay) for use by authorized laboratories  Clinical caution and judgement should be used with the interpretation of these results with consideration of the clinical impression and other laboratory testing  Testing reported as "Positive" or "Negative" has been proven to be accurate according to standard laboratory validation requirements  All testing is performed with control materials showing appropriate reactivity at standard intervals      D-dimer, quantitative [[id number]]  (Abnormal) Collected: 11/25/20 2011    Lab Status: Final result Specimen: Blood from Arm, Left Updated: 11/25/20 2140     D-Dimer, Quant 2 02 ug/ml FEU     Comprehensive metabolic panel [[id number]]  (Abnormal) Collected: 11/25/20 2011    Lab Status: Final result Specimen: Blood from Arm, Left Updated: 11/25/20 2045     Sodium 137 mmol/L      Potassium 3 5 mmol/L      Chloride 101 mmol/L      CO2 25 mmol/L      ANION GAP 11 mmol/L      BUN 15 mg/dL      Creatinine 0 77 mg/dL      Glucose 135 mg/dL      Calcium 8 5 mg/dL      AST 46 U/L      ALT 44 U/L      Alkaline Phosphatase 65 U/L      Total Protein 6 9 g/dL      Albumin 4 0 g/dL      Total Bilirubin 0 60 mg/dL      eGFR 89 ml/min/1 73sq m     Narrative:      [place] guidelines for Chronic Kidney Disease (CKD):     Stage 1 with normal or high GFR (GFR > 90 mL/min/1 73 square meters)    Stage 2 Mild CKD (GFR = 60-89 mL/min/1 73 square meters)    Stage 3A Moderate CKD (GFR = 45-59 mL/min/1 73 square meters)    Stage 3B Moderate CKD (GFR = 30-44 mL/min/1 73 square meters)    Stage 4 Severe CKD (GFR = 15-29 mL/min/1 73 square meters)    Stage 5 End Stage CKD (GFR <15 mL/min/1 73 square meters)  Note: GFR calculation is accurate only with a steady state creatinine    Lactic acid [[id number]]  (Normal) Collected: 11/25/20 2011    Lab Status: Final result Specimen: Blood from Arm, Left Updated: 11/25/20 2044     LACTIC ACID 0 9 mmol/L     Narrative:      Result may be elevated if tourniquet was used during collection      Protime-INR [[id number]]  (Normal) Collected: 11/25/20 2011    Lab Status: Final result Specimen: Blood from Arm, Left Updated: 11/25/20 2034     Protime 12 9 seconds      INR 0 98    APTT [[id number]]  (Normal) Collected: 11/25/20 2011    Lab Status: Final result Specimen: Blood from Arm, Left Updated: 11/25/20 2034     PTT 33 seconds     CBC and differential [[id number]]  (Abnormal) Collected: 11/25/20 2011    Lab Status: Final result Specimen: Blood from Arm, Left Updated: 11/25/20 2021     WBC 4 30 Thousand/uL      RBC 5 03 Million/uL      Hemoglobin 16 0 g/dL      Hematocrit 45 6 %      MCV 91 fL      MCH 31 9 pg      MCHC 35 1 g/dL      RDW 12 5 %      MPV 8 7 fL Platelets 768 Thousands/uL      Neutrophils Relative 77 %      Lymphocytes Relative 19 %      Monocytes Relative 5 %      Eosinophils Relative 0 %      Basophils Relative 0 %      Neutrophils Absolute 3 30 Thousands/µL      Lymphocytes Absolute 0 80 Thousands/µL      Monocytes Absolute 0 20 Thousand/µL      Eosinophils Absolute 0 00 Thousand/µL      Basophils Absolute 0 00 Thousands/µL                  CTA ED chest PE Study   Final Result by Janelle Beauchamp MD (11/25 2301)      Multifocal pneumonia  Enlarged pulmonary artery suggesting pulmonary hypertension  No evidence of pulmonary embolism  Hepatic steatosis  Workstation performed: [id number]         XR chest 1 view portable   Final Result by Janelle Beauchamp MD (11/25 2302)      Multifocal pneumonia  Workstation performed: [id number]                    Procedures  Procedures         ED Course                       Initial Sepsis Screening     Row Name 11/25/20 7406                Is the patient's history suggestive of a new or worsening infection? (!) Yes (Proceed)  -KM        Suspected source of infection  pneumonia  -KM        Are two or more of the following signs & symptoms of infection both present and new to the patient? (!) Yes (Proceed)  -KM        Indicate SIRS criteria  Tachypnea > 20 resp per min; Tachycardia > 90 bpm  -KM        If the answer is yes to both questions, suspicion of sepsis is present  --        If severe sepsis is present AND tissue hypoperfusion perists in the hour after fluid resuscitation or lactate > 4, the patient meets criteria for SEPTIC SHOCK  --        Are any of the following organ dysfunction criteria present within 6 hours of suspected infection and SIRS criteria that are NOT considered to be chronic conditions?   No  -KM        Organ dysfunction  --        Date of presentation of severe sepsis  --        Time of presentation of severe sepsis  --        Tissue hypoperfusion persists in the hour after crystalloid fluid administration, evidenced, by either:  --        Was hypotension present within one hour of the conclusion of crystalloid fluid administration?  --        Date of presentation of septic shock  --        Time of presentation of septic shock  --          User Key  (r) = Recorded By, (t) = Taken By, (c) = Cosigned By    [address] Name Provider Type    [address], [address] Nurse Practitioner                      MDM  Number of Diagnoses or Management Options  Pneumonia: new and requires workup  Diagnosis management comments: This is a 76-year-old female pneumonia  Patient requiring 2 L nasal cannula appears clinically unwell  Antibiotics in large fluid resuscitation initially held off due to thoughts that this was COVID and not wanting to [facility] ARDS  Patient started on antibiotics and given more liberal fluid bolus COVID was negative  Patient stable at the time of admission  States understanding and agreement plan  Amount and/or Complexity of Data Reviewed  Clinical lab tests: ordered and reviewed  Tests in the radiology section of CPT®: ordered and reviewed        Disposition  Final diagnoses:   Pneumonia     Time reflects when diagnosis was documented in both MDM as applicable and the Disposition within this note     Time User Action Codes Description Comment    11/25/2020 11:24 PM Jose A Garcia Add [J18 [id number]] Pneumonia       ED Disposition     ED Disposition Condition Date/Time Comment    Admit Stable Wed Nov 25, 2020 11:24 PM Case was discussed with awa and the patient's admission status was agreed to be Admission Status: inpatient status to the service of Dr Jossy Seymour           Follow-up Information    None         Current Discharge Medication List      CONTINUE these medications which have NOT CHANGED    Details   traMADol (ULTRAM) 50 mg tablet Take 1 tablet (50 mg total) by mouth every 6 (six) hours as needed for moderate pain for up to 10 doses  Qty: 10 tablet, Refills: 0    Associated Diagnoses: Viral illness           No discharge procedures on file      PDMP Review     None          ED Provider  Electronically Signed by           Ananth Adams MD  11/26/20 3963

## 2020-11-26 NOTE — PROGRESS NOTES
Progress Note - Seymour Be 1970, 44 y o  female MRN: [id number]    Unit/Bed#: -01 Encounter: [id number]    Primary Care Provider: No primary care provider on file  Date and time admitted to hospital: 2020  7:12 PM        * Pneumonia of both lower lobes due to infectious organism  Assessment & Plan  · Presumed COVID positive despite having negative test result  · Ferritin elevated, D-dimer elevated but CTA PE study negative for PE  · Started on mild COVID protocol order set, continue with antibiotics and steroids  · Given concern for multifocal pneumonia, will broaden ceftriaxone to cefepime for pseudomonal coverage, continue azithromycin  · Trend CRP, ferritin, inflammatory markers  · Continue with supportive care    SIRS due to infectious process with organ dysfunction [facility])  Assessment & Plan  · see plan for multifocal pneumonia    COVID-19 determined by clinical diagnostic criteria  Assessment & Plan  · See plan for multifocal pneumonia  · Holding off on remdesivir for now      VTE Pharmacologic Prophylaxis:   Pharmacologic: Enoxaparin (Lovenox)  Mechanical VTE Prophylaxis in Place: No    Patient Centered Rounds: I have performed bedside rounds with nursing staff today  Time Spent for Care: 30 minutes  More than 50% of total time spent on counseling and coordination of care as described above      Current Length of Stay: 1 day(s)    Current Patient Status: Inpatient   Certification Statement: The patient will continue to require additional inpatient hospital stay due to Multifocal pneumonia    Discharge Plan:  48 hours    Code Status: Level 1 - Full Code      Subjective:   Patient feels about the same as yesterday, still having some shortness of breath    Objective:     Vitals:   Temp (24hrs), Av °F (37 2 °C), Min:98 6 °F (37 °C), Max:99 3 °F (37 4 °C)    Temp:  [98 6 °F (37 °C)-99 3 °F (37 4 °C)] 98 9 °F (37 2 °C)  HR:  [] 73  Resp:  [18-22] 18  BP: ()/(56-75) 122/63  SpO2:  [93 %-95 %] 93 %  Body mass index is 27 55 kg/m²  Input and Output Summary (last 24 hours): Intake/Output Summary (Last 24 hours) at 11/26/2020 0936  Last data filed at 11/26/2020 0023  Gross per 24 hour   Intake 2000 ml   Output --   Net 2000 ml       Physical Exam:     Physical Exam  HENT:      Head: Normocephalic and atraumatic  Cardiovascular:      Rate and Rhythm: Normal rate and regular rhythm  Pulmonary:      Effort: No respiratory distress  Comments: On 2 liters nasal canula  Abdominal:      General: Abdomen is flat  Skin:     General: Skin is warm and dry  Neurological:      Mental Status: She is alert and oriented to person, place, and time  Mental status is at baseline  Psychiatric:         Mood and Affect: Mood normal          Behavior: Behavior normal            Additional Data:     Labs:    Results from last 7 days   Lab Units 11/26/20  0548   WBC Thousand/uL 3 90*   HEMOGLOBIN g/dL 14 4   HEMATOCRIT % 41 0*   PLATELETS Thousands/uL 124*   NEUTROS PCT % 76*   LYMPHS PCT % 21   MONOS PCT % 3   EOS PCT % 0     Results from last 7 days   Lab Units 11/26/20  0547   SODIUM mmol/L 135   POTASSIUM mmol/L 3 9   CHLORIDE mmol/L 105   CO2 mmol/L 19*   BUN mg/dL 14   CREATININE mg/dL 0 67   ANION GAP mmol/L 11   CALCIUM mg/dL 7 7*   ALBUMIN g/dL 3 3*   TOTAL BILIRUBIN mg/dL 0 50   ALK PHOS U/L 55   ALT U/L 34   AST U/L 34   GLUCOSE RANDOM mg/dL 219*     Results from last 7 days   Lab Units 11/25/20 2011   INR  0 98             Results from last 7 days   Lab Units 11/25/20 2011   LACTIC ACID mmol/L 0 9   PROCALCITONIN ng/ml 0 22           * I Have Reviewed All Lab Data Listed Above  * Additional Pertinent Lab Tests Reviewed:  Jamir 64 Admission Reviewed    Imaging:    Imaging Reports Reviewed Today Include:  CTA chest  Imaging Personally Reviewed by Myself Includes:  See above    Recent Cultures (last 7 days):     Results from last 7 days   Lab Units 11/25/20 2011   BLOOD CULTURE  Received in Microbiology Lab  Culture in Progress  Received in Microbiology Lab  Culture in Progress  Last 24 Hours Medication List:   Current Facility-Administered Medications   Medication Dose Route Frequency Provider Last Rate    acetaminophen  650 mg Oral Q6H PRN Lou Julian, MIKALANP      ascorbic acid  1,000 mg Oral Q12H [address] Lou Julian, CRNP      azithromycin  500 mg Oral Q24H Lou Julian, CRNP      cefepime  2,000 mg Intravenous Q12H Nadia Briscoe MD      cholecalciferol  2,000 Units Oral Daily Lou A Iesha, MIKALANP      dexamethasone  6 mg Intravenous Q24H Lou A Iesha, CRNP      enoxaparin  30 mg Subcutaneous Q12H [address] Lou Julian, APOLONIA      famotidine  20 mg Intravenous BID Lou A Iesha, CRNP      guaiFENesin  600 mg Oral BID Lou A Iesha, CRNP      zinc sulfate  220 mg Oral Daily Lou Julian, APOLONIA      Followed by   Jyoti Florentino ON 12/3/2020] multivitamin-minerals  1 tablet Oral Daily Lou Julian, MIKALANP      ondansetron  4 mg Intravenous Q6H PRN Lou A Iesha, CRNP      sodium chloride 0 9 % with KCl 20 mEq/L  100 mL/hr Intravenous Continuous Lou A Iesha, CRNP 100 mL/hr (11/26/20 0023)        Today, Patient Was Seen By: BECKY Solis      ** Please Note: Dictation voice to text software may have been used in the creation of this document   **

## 2020-11-26 NOTE — H&P
H&P- Seymour Be 1970, 44 y o  female MRN: [id number]    Unit/Bed#: -01 Encounter: [id number]    Primary Care Provider: No primary care provider on file  Date and time admitted to hospital: 11/25/2020  7:12 PM        * Pneumonia of both lower lobes due to infectious organism  Assessment & Plan  · Multi focal pneumonia on both chest x-ray and CTA PE study  · Patient's initial COVID test was negative - patient appears to be COVID positive by assessment  · Will recheck COVID test in a m  · Follow mild treatment plan for COVID positive symptoms  · Continue azithromycin and Rocephin  · Aspiration precautions  · Elevate head of bed  · Respiratory protocol  · Incentive spirometer  · Sputum culture  · Urine for Legionella and strep  · Mucinex    COVID-19 determined by clinical diagnostic criteria  Assessment & Plan  · Will follow mild treatment algorithm  · Patient not started on remdesivir  · Initial test was negative  · Patient presents with suspected signs and symptoms  · Will retest in a m  SIRS due to infectious process with organ dysfunction (HCC)  Assessment & Plan  · Low-grade temperature, tachycardia, tachypnea, patient requiring oxygen for 95% saturation  · Patient with elevated D-dimer of 2 02  · Procalcitonin pending  · Urinalysis pending  · Continue antibiotics and IV fluids      VTE Prophylaxis: Enoxaparin (Lovenox)  Code Status:  Full code  Discussion with family:  Discussed with the    Anticipated Length of Stay:  Patient will be admitted on an Inpatient basis with an anticipated length of stay of  > 2 midnights  Justification for Hospital Stay:  Antibiotics and IV fluids for pneumonia    Total Time for Visit, including Counseling / Coordination of Care: 45 minutes  Greater than 50% of this total time spent on direct patient counseling and coordination of care      Chief Complaint:   Cough, vomiting, diarrhea, chills, generalized body pain, headache x1 week    History of Present Illness:    Donny Key is a 44 y o  female who presents with  Cough, vomiting, diarrhea, chills, generalized body pain, headache x1 week  Patient also complained of shortness of breath  Initial COVID test was negative however patient does appear to be COVID positive therefore test will be repeated in a m  Neal Savage Patient underwent CTA PE protocol study secondary to D-dimer being elevated at 2 02, patient without pulmonary emboli, positive for multifocal pneumonia and pulmonary hypertension  Chest x-ray also confirms pneumonia  Review of Systems:    Review of Systems   Constitutional: Positive for fatigue  Respiratory: Positive for cough and shortness of breath  Gastrointestinal: Positive for diarrhea, nausea and vomiting  Musculoskeletal: Positive for myalgias  Neurological: Positive for weakness  Past Medical and Surgical History:     History reviewed  No pertinent past medical history  Past Surgical History:   Procedure Laterality Date    APPENDECTOMY         Meds/Allergies:    Prior to Admission medications    Medication Sig Start Date End Date Taking? Authorizing Provider   traMADol (ULTRAM) 50 mg tablet Take 1 tablet (50 mg total) by mouth every 6 (six) hours as needed for moderate pain for up to 10 doses 10/21/18   Sandeep Quach MD     all medications and allergies reviewed    Allergies:    Allergies   Allergen Reactions    Codeine Vomiting       Social History:     Marital Status: /Civil Union   Occupation:  Working  Patient Pre-hospital Living Situation:  At home  Patient Pre-hospital Level of Mobility:  Function  Patient Pre-hospital Diet Restrictions:  Regular  Substance Use History:   Social History     Substance and Sexual Activity   Alcohol Use No    Frequency: Never    Drinks per session: Patient refused    Binge frequency: Never     Social History     Tobacco Use   Smoking Status Never Smoker   Smokeless Tobacco Never Used     Social History     Substance and Sexual Activity   Drug Use No       Family History:  I have reviewed the patient's family history    Physical Exam:     Vitals:   Blood Pressure: 120/58 (11/26/20 0300)  Pulse: 78 (11/26/20 0300)  Temperature: 98 6 °F (37 °C) (11/26/20 0300)  Temp Source: Temporal (11/26/20 0300)  Respirations: 20 (11/26/20 0300)  Height: 5' 5" (165 1 cm) (11/26/20 0000)  Weight - Scale: 75 1 kg (165 lb 10 8 oz) (11/26/20 0000)  SpO2: 93 % (11/26/20 0300)    Physical Exam  Vitals reviewed: Physical assessment performed by nursing staff on 11/26/2020 at 12:04 a m      Constitutional:       Appearance: Normal appearance  HENT:      Head: Normocephalic and atraumatic  Nose: Nose normal       Mouth/Throat:      Mouth: Mucous membranes are moist    Eyes:      Extraocular Movements: Extraocular movements intact  Neck:      Musculoskeletal: Normal range of motion  Cardiovascular:      Rate and Rhythm: Regular rhythm  Tachycardia present  Pulses: Normal pulses  Heart sounds: Normal heart sounds  Pulmonary:      Effort: Tachypnea present  Breath sounds: Examination of the right-lower field reveals decreased breath sounds  Examination of the left-lower field reveals decreased breath sounds  Decreased breath sounds present  Comments: Nonproductive cough on 2 L nasal cannula  Abdominal:      General: Bowel sounds are normal    Musculoskeletal:      Comments: Generalized weakness   Skin:     General: Skin is warm  Neurological:      Mental Status: She is alert and oriented to person, place, and time  Psychiatric:         Attention and Perception: Attention normal          Mood and Affect: Mood normal          Speech: Speech normal          Behavior: Behavior normal          Additional Data:     Lab Results: I have personally reviewed pertinent reports        Results from last 7 days   Lab Units 11/25/20 2011   WBC Thousand/uL 4 30*   HEMOGLOBIN g/dL 16 0   HEMATOCRIT % 45 6   PLATELETS Thousands/uL 121* NEUTROS PCT % 77*   LYMPHS PCT % 19*   MONOS PCT % 5   EOS PCT % 0     Results from last 7 days   Lab Units 11/25/20 2011   SODIUM mmol/L 137   POTASSIUM mmol/L 3 5   CHLORIDE mmol/L 101   CO2 mmol/L 25   BUN mg/dL 15   CREATININE mg/dL 0 77   ANION GAP mmol/L 11   CALCIUM mg/dL 8 5*   ALBUMIN g/dL 4 0   TOTAL BILIRUBIN mg/dL 0 60   ALK PHOS U/L 65   ALT U/L 44   AST U/L 46*   GLUCOSE RANDOM mg/dL 135*     Results from last 7 days   Lab Units 11/25/20 2011   INR  0 98             Results from last 7 days   Lab Units 11/25/20 2011   LACTIC ACID mmol/L 0 9   PROCALCITONIN ng/ml 0 22       Imaging: I have personally reviewed pertinent reports  CTA ED chest PE Study   Final Result by Lewis Foote MD (11/25 9656)      Multifocal pneumonia  Enlarged pulmonary artery suggesting pulmonary hypertension  No evidence of pulmonary embolism  Hepatic steatosis  Workstation performed: [id number]         XR chest 1 view portable   Final Result by Lewis Foote MD (11/25 4544)      Multifocal pneumonia  Workstation performed: [id number]               EKG, Pathology, and Other Studies Reviewed on Admission:   · EKG:  Not performed in the ED    Epic / Care Everywhere Records Reviewed: Yes    ** Please Note: This note has been constructed using a voice recognition system   **

## 2020-11-27 PROBLEM — R73.9 HYPERGLYCEMIA: Status: ACTIVE | Noted: 2020-11-27

## 2020-11-27 PROBLEM — I27.20 PULMONARY HYPERTENSION (HCC): Status: ACTIVE | Noted: 2020-11-27

## 2020-11-27 LAB
ALBUMIN SERPL BCP-MCNC: 3.2 G/DL (ref 3.5–5.7)
ALP SERPL-CCNC: 55 U/L (ref 40–150)
ALT SERPL W P-5'-P-CCNC: 31 U/L (ref 7–52)
ANION GAP SERPL CALCULATED.3IONS-SCNC: 9 MMOL/L (ref 4–13)
AST SERPL W P-5'-P-CCNC: 38 U/L (ref 13–39)
ATRIAL RATE: 90 BPM
BASOPHILS # BLD AUTO: 0 THOUSANDS/ΜL (ref 0–0.1)
BASOPHILS NFR BLD AUTO: 0 % (ref 0–2)
BILIRUB SERPL-MCNC: 0.5 MG/DL (ref 0.2–1)
BUN SERPL-MCNC: 15 MG/DL (ref 7–25)
CALCIUM ALBUM COR SERPL-MCNC: 8.2 MG/DL (ref 8.3–10.1)
CALCIUM SERPL-MCNC: 7.6 MG/DL (ref 8.6–10.5)
CHLORIDE SERPL-SCNC: 109 MMOL/L (ref 98–107)
CO2 SERPL-SCNC: 20 MMOL/L (ref 21–31)
CREAT SERPL-MCNC: 0.72 MG/DL (ref 0.6–1.2)
CRP SERPL QL: 33.2 MG/L
EOSINOPHIL # BLD AUTO: 0 THOUSAND/ΜL (ref 0–0.61)
EOSINOPHIL NFR BLD AUTO: 0 % (ref 0–5)
ERYTHROCYTE [DISTWIDTH] IN BLOOD BY AUTOMATED COUNT: 12.4 % (ref 11.5–14.5)
FERRITIN SERPL-MCNC: 597 NG/ML (ref 8–388)
GFR SERPL CREATININE-BSD FRML MDRD: 97 ML/MIN/1.73SQ M
GLUCOSE SERPL-MCNC: 168 MG/DL (ref 65–140)
GLUCOSE SERPL-MCNC: 187 MG/DL (ref 65–140)
GLUCOSE SERPL-MCNC: 212 MG/DL (ref 65–99)
GLUCOSE SERPL-MCNC: 238 MG/DL (ref 65–140)
HCT VFR BLD AUTO: 40 % (ref 42–47)
HGB BLD-MCNC: 13.7 G/DL (ref 12–16)
LYMPHOCYTES # BLD AUTO: 1 THOUSANDS/ΜL (ref 0.6–4.47)
LYMPHOCYTES NFR BLD AUTO: 21 % (ref 21–51)
MAGNESIUM SERPL-MCNC: 2.2 MG/DL (ref 1.9–2.7)
MCH RBC QN AUTO: 31.4 PG (ref 26–34)
MCHC RBC AUTO-ENTMCNC: 34.3 G/DL (ref 31–37)
MCV RBC AUTO: 92 FL (ref 81–99)
MONOCYTES # BLD AUTO: 0.2 THOUSAND/ΜL (ref 0.17–1.22)
MONOCYTES NFR BLD AUTO: 5 % (ref 2–12)
NEUTROPHILS # BLD AUTO: 3.5 THOUSANDS/ΜL (ref 1.4–6.5)
NEUTS SEG NFR BLD AUTO: 74 % (ref 42–75)
P AXIS: 41 DEGREES
PLATELET # BLD AUTO: 130 THOUSANDS/UL (ref 149–390)
PMV BLD AUTO: 9.2 FL (ref 8.6–11.7)
POTASSIUM SERPL-SCNC: 4.3 MMOL/L (ref 3.5–5.5)
PR INTERVAL: 130 MS
PROT SERPL-MCNC: 5.7 G/DL (ref 6.4–8.9)
QRS AXIS: 53 DEGREES
QRSD INTERVAL: 84 MS
QT INTERVAL: 352 MS
QTC INTERVAL: 430 MS
RBC # BLD AUTO: 4.37 MILLION/UL (ref 3.9–5.2)
SODIUM SERPL-SCNC: 138 MMOL/L (ref 134–143)
T WAVE AXIS: 45 DEGREES
VENTRICULAR RATE: 90 BPM
WBC # BLD AUTO: 4.7 THOUSAND/UL (ref 4.8–10.8)

## 2020-11-27 PROCEDURE — 82948 REAGENT STRIP/BLOOD GLUCOSE: CPT

## 2020-11-27 PROCEDURE — 87493 C DIFF AMPLIFIED PROBE: CPT | Performed by: STUDENT IN AN ORGANIZED HEALTH CARE EDUCATION/TRAINING PROGRAM

## 2020-11-27 PROCEDURE — 99232 SBSQ HOSP IP/OBS MODERATE 35: CPT | Performed by: STUDENT IN AN ORGANIZED HEALTH CARE EDUCATION/TRAINING PROGRAM

## 2020-11-27 PROCEDURE — 87505 NFCT AGENT DETECTION GI: CPT | Performed by: STUDENT IN AN ORGANIZED HEALTH CARE EDUCATION/TRAINING PROGRAM

## 2020-11-27 PROCEDURE — 93010 ELECTROCARDIOGRAM REPORT: CPT | Performed by: INTERNAL MEDICINE

## 2020-11-27 PROCEDURE — 80053 COMPREHEN METABOLIC PANEL: CPT | Performed by: STUDENT IN AN ORGANIZED HEALTH CARE EDUCATION/TRAINING PROGRAM

## 2020-11-27 PROCEDURE — 83735 ASSAY OF MAGNESIUM: CPT | Performed by: STUDENT IN AN ORGANIZED HEALTH CARE EDUCATION/TRAINING PROGRAM

## 2020-11-27 PROCEDURE — 82728 ASSAY OF FERRITIN: CPT | Performed by: STUDENT IN AN ORGANIZED HEALTH CARE EDUCATION/TRAINING PROGRAM

## 2020-11-27 PROCEDURE — 86140 C-REACTIVE PROTEIN: CPT | Performed by: STUDENT IN AN ORGANIZED HEALTH CARE EDUCATION/TRAINING PROGRAM

## 2020-11-27 PROCEDURE — 85025 COMPLETE CBC W/AUTO DIFF WBC: CPT | Performed by: STUDENT IN AN ORGANIZED HEALTH CARE EDUCATION/TRAINING PROGRAM

## 2020-11-27 RX ORDER — GUAIFENESIN/DEXTROMETHORPHAN 100-10MG/5
10 SYRUP ORAL EVERY 4 HOURS PRN
Status: DISCONTINUED | OUTPATIENT
Start: 2020-11-27 | End: 2020-11-28 | Stop reason: HOSPADM

## 2020-11-27 RX ADMIN — DEXAMETHASONE SODIUM PHOSPHATE 6 MG: 4 INJECTION, SOLUTION INTRA-ARTICULAR; INTRALESIONAL; INTRAMUSCULAR; INTRAVENOUS; SOFT TISSUE at 00:01

## 2020-11-27 RX ADMIN — REMDESIVIR 200 MG: 100 INJECTION, POWDER, LYOPHILIZED, FOR SOLUTION INTRAVENOUS at 11:50

## 2020-11-27 RX ADMIN — OXYCODONE HYDROCHLORIDE AND ACETAMINOPHEN 1000 MG: 500 TABLET ORAL at 21:06

## 2020-11-27 RX ADMIN — FAMOTIDINE 20 MG: 10 INJECTION, SOLUTION INTRAVENOUS at 18:09

## 2020-11-27 RX ADMIN — INSULIN LISPRO 1 UNITS: 100 INJECTION, SOLUTION INTRAVENOUS; SUBCUTANEOUS at 21:06

## 2020-11-27 RX ADMIN — ENOXAPARIN SODIUM 30 MG: 30 INJECTION SUBCUTANEOUS at 10:18

## 2020-11-27 RX ADMIN — Medication 2000 UNITS: at 10:17

## 2020-11-27 RX ADMIN — ENOXAPARIN SODIUM 30 MG: 30 INJECTION SUBCUTANEOUS at 21:06

## 2020-11-27 RX ADMIN — ZINC SULFATE 220 MG (50 MG) CAPSULE 220 MG: CAPSULE at 10:17

## 2020-11-27 RX ADMIN — GUAIFENESIN 600 MG: 600 TABLET, EXTENDED RELEASE ORAL at 10:17

## 2020-11-27 RX ADMIN — CEFEPIME HYDROCHLORIDE 2000 MG: 2 INJECTION, SOLUTION INTRAVENOUS at 10:21

## 2020-11-27 RX ADMIN — CEFEPIME HYDROCHLORIDE 2000 MG: 2 INJECTION, SOLUTION INTRAVENOUS at 22:56

## 2020-11-27 RX ADMIN — AZITHROMYCIN MONOHYDRATE 500 MG: 500 INJECTION, POWDER, LYOPHILIZED, FOR SOLUTION INTRAVENOUS at 00:01

## 2020-11-27 RX ADMIN — OXYCODONE HYDROCHLORIDE AND ACETAMINOPHEN 1000 MG: 500 TABLET ORAL at 10:21

## 2020-11-27 RX ADMIN — INSULIN LISPRO 1 UNITS: 100 INJECTION, SOLUTION INTRAVENOUS; SUBCUTANEOUS at 16:17

## 2020-11-27 RX ADMIN — INSULIN LISPRO 2 UNITS: 100 INJECTION, SOLUTION INTRAVENOUS; SUBCUTANEOUS at 12:02

## 2020-11-27 NOTE — PROGRESS NOTES
Progress Note - Wendy Reyes 1970, 44 y o  female MRN: [id number]    Unit/Bed#: -01 Encounter: [id number]    Primary Care Provider: No primary care provider on file  Date and time admitted to hospital: 11/25/2020  7:12 PM        Pulmonary hypertension (HCC)  Assessment & Plan  · Noted on CT imaging, patient's daughter reports that she has been having productive cough and dyspnea on exertion for the last several months  · Will likely need outpatient pulmonology follow-up    * Pneumonia of both lower lobes due to infectious organism  Assessment & Plan  · Presumed COVID positive despite having negative test result  · Ferritin elevated, D-dimer elevated but CTA PE study negative for PE  · Started on mild COVID protocol order set, continue with antibiotics and steroids  · Given concern for multifocal pneumonia, ceftriaxone broadened to cefepime for pseudomonal coverage, continue azithromycin  · Trend CRP, ferritin, inflammatory markers  · Continue with supportive care  · Spiking high-grade fevers overnight, will initiate remdesivir    Hyperglycemia  Assessment & Plan  · Hyperglycemic, no history of diabetes  · Check A1c, start sliding scale    SIRS due to infectious process with organ dysfunction [facility])  Assessment & Plan  · see plan for multifocal pneumonia    COVID-19 determined by clinical diagnostic criteria  Assessment & Plan  · See plan for multifocal pneumonia  · Initiate remdesivir    VTE Pharmacologic Prophylaxis:   Pharmacologic: Enoxaparin (Lovenox)  Mechanical VTE Prophylaxis in Place: No    Patient Centered Rounds: I have performed bedside rounds with nursing staff today  Education and Discussions with Family / Patient:  Spoke to patient's daughter Savannah Ya, made her aware of the pulmonary hypertension and current COVID treatment  Patient's is agreeable also told her that I was start remdesivir    Time Spent for Care: 30 minutes    More than 50% of total time spent on counseling and coordination of care as described above  Current Length of Stay: 2 day(s)    Current Patient Status: Inpatient   Certification Statement: The patient will continue to require additional inpatient hospital stay due to [place]    Discharge Plan:  48 hours    Code Status: Level 1 - Full Code      Subjective:   Patient having high-grade fevers    Objective:     Vitals:   Temp (24hrs), Av 1 °F (37 8 °C), Min:97 4 °F (36 3 °C), Max:103 8 °F (39 9 °C)    Temp:  [97 4 °F (36 3 °C)-103 8 °F (39 9 °C)] 97 5 °F (36 4 °C)  HR:  [61-92] 62  Resp:  [16-20] 20  BP: (130-148)/(63-74) 130/68  SpO2:  [91 %-93 %] 92 %  Body mass index is 29 06 kg/m²  Input and Output Summary (last 24 hours): Intake/Output Summary (Last 24 hours) at 2020 1402  Last data filed at 2020 1700  Gross per 24 hour   Intake 240 ml   Output --   Net 240 ml       Physical Exam:     Physical Exam  Constitutional:       Appearance: Normal appearance  HENT:      Head: Normocephalic and atraumatic  Cardiovascular:      Rate and Rhythm: Normal rate  Pulmonary:      Effort: No respiratory distress  Abdominal:      General: Abdomen is flat  Palpations: Abdomen is soft  Skin:     General: Skin is dry  Neurological:      Mental Status: She is alert and oriented to person, place, and time  Mental status is at baseline     Psychiatric:         Mood and Affect: Mood normal          Behavior: Behavior normal          Additional Data:     Labs:    Results from last 7 days   Lab Units 20  0603   WBC Thousand/uL 4 70*   HEMOGLOBIN g/dL 13 7   HEMATOCRIT % 40 0*   PLATELETS Thousands/uL 130*   NEUTROS PCT % 74   LYMPHS PCT % 21   MONOS PCT % 5   EOS PCT % 0     Results from last 7 days   Lab Units 20  0603   SODIUM mmol/L 138   POTASSIUM mmol/L 4 3   CHLORIDE mmol/L 109*   CO2 mmol/L 20*   BUN mg/dL 15   CREATININE mg/dL 0 72   ANION GAP mmol/L 9   CALCIUM mg/dL 7 6*   ALBUMIN g/dL 3 2*   TOTAL BILIRUBIN mg/dL 0 50   ALK PHOS U/L 55   ALT U/L 31   AST U/L 38   GLUCOSE RANDOM mg/dL 212*     Results from last 7 days   Lab Units 11/25/20 2011   INR  0 98     Results from last 7 days   Lab Units 11/27/20  1154   POC GLUCOSE mg/dl 238*         Results from last 7 days   Lab Units 11/26/20  0547 11/25/20 2011   LACTIC ACID mmol/L  --  0 9   PROCALCITONIN ng/ml 0 43* 0 22           * I Have Reviewed All Lab Data Listed Above  * Additional Pertinent Lab Tests Reviewed: Jamir 64 Admission Reviewed    Imaging:    Imaging Reports Reviewed Today Include: na  Imaging Personally Reviewed by Myself Includes:  na    Recent Cultures (last 7 days):     Results from last 7 days   Lab Units 11/26/20  0123 11/25/20 2011   BLOOD CULTURE   --  No Growth at 24 hrs  No Growth at 24 hrs     LEGIONELLA URINARY ANTIGEN  Negative  --        Last 24 Hours Medication List:   Current Facility-Administered Medications   Medication Dose Route Frequency Provider Last Rate    acetaminophen  650 mg Oral Q6H PRN APOLONIA Deutsch      ascorbic acid  1,000 mg Oral Q12H [address] APOLONIA Deutsch      azithromycin  500 mg Intravenous Q24H Dalpedro luis Yoo PA-C 500 mg (11/27/20 0001)    cefepime  2,000 mg Intravenous Q12H Miguel Ramos MD 2,000 mg (11/27/20 1021)    cholecalciferol  2,000 Units Oral Daily APOLONIA Deutsch      dexamethasone  6 mg Intravenous Q24H APOLONIA Deutsch      dextromethorphan-guaiFENesin  10 mL Oral Q4H PRN Miguel Ramos MD      enoxaparin  30 mg Subcutaneous Q12H [address] APOLONIA Deutsch      famotidine  20 mg Intravenous BID APOLONIA Deutsch      insulin lispro  1-5 Units Subcutaneous TID [facility] Miguel Ramos MD      insulin lispro  1-5 Units Subcutaneous HS Miguel Ramos MD      zinc sulfate  220 mg Oral Daily APOLONIA Deutsch      Followed by   Severiano Cee ON 12/3/2020] multivitamin-minerals  1 tablet Oral Daily APOLONIA Deutsch      ondansetron  4 mg Intravenous Q6H PRN Kimmy Singh A APOLONIA Julian      [START ON 11/28/2020] remdesivir  100 mg Intravenous Q24H Michelle Sanchez MD          Today, Patient Was Seen By: BECKY Zarate      ** Please Note: Dictation voice to text software may have been used in the creation of this document   **

## 2020-11-27 NOTE — NURSING NOTE
Received information from PCA that patient's temperature was 103 8  Acetaminophen (TYLENOL) tablet 650 mg given to patient per PRN order  Ice packs provided to patient  Two hours later, patient's temperature was down to 99 5  Patient is resting comfortably

## 2020-11-27 NOTE — PLAN OF CARE
Problem: Potential for Falls  Goal: Patient will remain free of falls  Description: INTERVENTIONS:  - Assess patient frequently for physical needs  -  Identify cognitive and physical deficits and behaviors that affect risk of falls    -  [place] fall precautions as indicated by assessment   - Educate patient/family on patient safety including physical limitations  - Instruct patient to call for assistance with activity based on assessment  - Modify environment to reduce risk of injury  - Consider OT/PT consult to assist with strengthening/mobility  Outcome: Progressing     Problem: PAIN - ADULT  Goal: Verbalizes/displays adequate comfort level or baseline comfort level  Description: Interventions:  - Encourage patient to monitor pain and request assistance  - Assess pain using appropriate pain scale  - Administer analgesics based on type and severity of pain and evaluate response  - Implement non-pharmacological measures as appropriate and evaluate response  - Consider cultural and social influences on pain and pain management  - Notify physician/advanced practitioner if interventions unsuccessful or patient reports new pain  Outcome: Progressing     Problem: INFECTION - ADULT  Goal: Absence or prevention of progression during hospitalization  Description: INTERVENTIONS:  - Assess and monitor for signs and symptoms of infection  - Monitor lab/diagnostic results  - Monitor all insertion sites, i e  indwelling lines, tubes, and drains  - Monitor endotracheal if appropriate and nasal secretions for changes in amount and color  - [place] appropriate cooling/warming therapies per order  - Administer medications as ordered  - Instruct and encourage patient and family to use good hand hygiene technique  - Identify and instruct in appropriate isolation precautions for identified infection/condition  Outcome: Progressing  Goal: Absence of fever/infection during neutropenic period  Description: INTERVENTIONS:  - Monitor WBC    Outcome: Progressing     Problem: SAFETY ADULT  Goal: Patient will remain free of falls  Description: INTERVENTIONS:  - Assess patient frequently for physical needs  -  Identify cognitive and physical deficits and behaviors that affect risk of falls    -  [place] fall precautions as indicated by assessment   - Educate patient/family on patient safety including physical limitations  - Instruct patient to call for assistance with activity based on assessment  - Modify environment to reduce risk of injury  - Consider OT/PT consult to assist with strengthening/mobility  Outcome: Progressing  Goal: Maintain or return to baseline ADL function  Description: INTERVENTIONS:  -  Assess patient's ability to carry out ADLs; assess patient's baseline for ADL function and identify physical deficits which impact ability to perform ADLs (bathing, care of mouth/teeth, toileting, grooming, dressing, etc )  - Assess/evaluate cause of self-care deficits   - Assess range of motion  - Assess patient's mobility; develop plan if impaired  - Assess patient's need for assistive devices and provide as appropriate  - Encourage maximum independence but intervene and supervise when necessary  - Involve family in performance of ADLs  - Assess for home care needs following discharge   - Consider OT consult to assist with ADL evaluation and planning for discharge  - Provide patient education as appropriate  Outcome: Progressing  Goal: Maintain or return mobility status to optimal level  Description: INTERVENTIONS:  - Assess patient's baseline mobility status (ambulation, transfers, stairs, etc )    - Identify cognitive and physical deficits and behaviors that affect mobility  - Identify mobility aids required to assist with transfers and/or ambulation (gait belt, sit-to-stand, lift, walker, cane, etc )  - [place] fall precautions as indicated by assessment  - Record patient progress and toleration of activity level on Mobility SBAR; progress patient to next Phase/Stage  - Instruct patient to call for assistance with activity based on assessment  - Consider rehabilitation consult to assist with strengthening/weightbearing, etc   Outcome: Progressing     Problem: DISCHARGE PLANNING  Goal: Discharge to home or other facility with appropriate resources  Description: INTERVENTIONS:  - Identify barriers to discharge w/patient and caregiver  - Arrange for needed discharge resources and transportation as appropriate  - Identify discharge learning needs (meds, wound care, etc )  - Arrange for interpretive services to assist at discharge as needed  - Refer to Case Management Department for coordinating discharge planning if the patient needs post-hospital services based on physician/advanced practitioner order or complex needs related to functional status, cognitive ability, or social support system  Outcome: Progressing     Problem: Knowledge Deficit  Goal: Patient/family/caregiver demonstrates understanding of disease process, treatment plan, medications, and discharge instructions  Description: Complete learning assessment and assess knowledge base    Interventions:  - Provide teaching at level of understanding  - Provide teaching via preferred learning methods  Outcome: Progressing

## 2020-11-27 NOTE — ASSESSMENT & PLAN NOTE
· Presumed COVID positive despite having negative test result  · Ferritin elevated, D-dimer elevated but CTA PE study negative for PE  · Started on mild COVID protocol order set, continue with antibiotics and steroids  · Given concern for multifocal pneumonia, ceftriaxone broadened to cefepime for pseudomonal coverage, continue azithromycin  · Trend CRP, ferritin, inflammatory markers  · Continue with supportive care  · Spiking high-grade fevers overnight, will initiate remdesivir

## 2020-11-27 NOTE — ASSESSMENT & PLAN NOTE
· Noted on CT imaging, patient's daughter reports that she has been having productive cough and dyspnea on exertion for the last several months  · Will likely need outpatient pulmonology follow-up

## 2020-11-27 NOTE — PLAN OF CARE
Problem: Potential for Falls  Goal: Patient will remain free of falls  Description: INTERVENTIONS:  - Assess patient frequently for physical needs  -  Identify cognitive and physical deficits and behaviors that affect risk of falls    -  [place] fall precautions as indicated by assessment   - Educate patient/family on patient safety including physical limitations  - Instruct patient to call for assistance with activity based on assessment  - Modify environment to reduce risk of injury  - Consider OT/PT consult to assist with strengthening/mobility  Outcome: Progressing     Problem: PAIN - ADULT  Goal: Verbalizes/displays adequate comfort level or baseline comfort level  Description: Interventions:  - Encourage patient to monitor pain and request assistance  - Assess pain using appropriate pain scale  - Administer analgesics based on type and severity of pain and evaluate response  - Implement non-pharmacological measures as appropriate and evaluate response  - Consider cultural and social influences on pain and pain management  - Notify physician/advanced practitioner if interventions unsuccessful or patient reports new pain  Outcome: Progressing     Problem: INFECTION - ADULT  Goal: Absence or prevention of progression during hospitalization  Description: INTERVENTIONS:  - Assess and monitor for signs and symptoms of infection  - Monitor lab/diagnostic results  - Monitor all insertion sites, i e  indwelling lines, tubes, and drains  - Monitor endotracheal if appropriate and nasal secretions for changes in amount and color  - [place] appropriate cooling/warming therapies per order  - Administer medications as ordered  - Instruct and encourage patient and family to use good hand hygiene technique  - Identify and instruct in appropriate isolation precautions for identified infection/condition  Outcome: Progressing  Goal: Absence of fever/infection during neutropenic period  Description: INTERVENTIONS:  - Monitor WBC    Outcome: Progressing     Problem: SAFETY ADULT  Goal: Patient will remain free of falls  Description: INTERVENTIONS:  - Assess patient frequently for physical needs  -  Identify cognitive and physical deficits and behaviors that affect risk of falls    -  [place] fall precautions as indicated by assessment   - Educate patient/family on patient safety including physical limitations  - Instruct patient to call for assistance with activity based on assessment  - Modify environment to reduce risk of injury  - Consider OT/PT consult to assist with strengthening/mobility  Outcome: Progressing  Goal: Maintain or return to baseline ADL function  Description: INTERVENTIONS:  -  Assess patient's ability to carry out ADLs; assess patient's baseline for ADL function and identify physical deficits which impact ability to perform ADLs (bathing, care of mouth/teeth, toileting, grooming, dressing, etc )  - Assess/evaluate cause of self-care deficits   - Assess range of motion  - Assess patient's mobility; develop plan if impaired  - Assess patient's need for assistive devices and provide as appropriate  - Encourage maximum independence but intervene and supervise when necessary  - Involve family in performance of ADLs  - Assess for home care needs following discharge   - Consider OT consult to assist with ADL evaluation and planning for discharge  - Provide patient education as appropriate  Outcome: Progressing  Goal: Maintain or return mobility status to optimal level  Description: INTERVENTIONS:  - Assess patient's baseline mobility status (ambulation, transfers, stairs, etc )    - Identify cognitive and physical deficits and behaviors that affect mobility  - Identify mobility aids required to assist with transfers and/or ambulation (gait belt, sit-to-stand, lift, walker, cane, etc )  - [place] fall precautions as indicated by assessment  - Record patient progress and toleration of activity level on Mobility SBAR; progress patient to next Phase/Stage  - Instruct patient to call for assistance with activity based on assessment  - Consider rehabilitation consult to assist with strengthening/weightbearing, etc   Outcome: Progressing

## 2020-11-27 NOTE — QUICK NOTE
Notified by nursing staff the patient is complaining of diarrhea, will check C diff and stool enteric panel

## 2020-11-28 VITALS
BODY MASS INDEX: 29.02 KG/M2 | HEIGHT: 65 IN | RESPIRATION RATE: 18 BRPM | OXYGEN SATURATION: 94 % | DIASTOLIC BLOOD PRESSURE: 72 MMHG | WEIGHT: 174.16 LBS | TEMPERATURE: 98.9 F | HEART RATE: 62 BPM | SYSTOLIC BLOOD PRESSURE: 136 MMHG

## 2020-11-28 PROBLEM — U07.1 COVID-19 DETERMINED BY CLINICAL DIAGNOSTIC CRITERIA: Status: RESOLVED | Noted: 2020-11-25 | Resolved: 2020-11-28

## 2020-11-28 LAB
ALBUMIN SERPL BCP-MCNC: 3.4 G/DL (ref 3.5–5.7)
ALP SERPL-CCNC: 62 U/L (ref 40–150)
ALT SERPL W P-5'-P-CCNC: 29 U/L (ref 7–52)
ANION GAP SERPL CALCULATED.3IONS-SCNC: 8 MMOL/L (ref 4–13)
AST SERPL W P-5'-P-CCNC: 27 U/L (ref 13–39)
BASOPHILS # BLD AUTO: 0 THOUSANDS/ΜL (ref 0–0.1)
BASOPHILS NFR BLD AUTO: 0 % (ref 0–2)
BILIRUB SERPL-MCNC: 0.6 MG/DL (ref 0.2–1)
BUN SERPL-MCNC: 16 MG/DL (ref 7–25)
C DIFF TOX B TCDB STL QL NAA+PROBE: NEGATIVE
CALCIUM ALBUM COR SERPL-MCNC: 8.9 MG/DL (ref 8.3–10.1)
CALCIUM SERPL-MCNC: 8.4 MG/DL (ref 8.6–10.5)
CHLORIDE SERPL-SCNC: 108 MMOL/L (ref 98–107)
CO2 SERPL-SCNC: 22 MMOL/L (ref 21–31)
CREAT SERPL-MCNC: 0.69 MG/DL (ref 0.6–1.2)
CRP SERPL QL: 19.5 MG/L
EOSINOPHIL # BLD AUTO: 0 THOUSAND/ΜL (ref 0–0.61)
EOSINOPHIL NFR BLD AUTO: 0 % (ref 0–5)
ERYTHROCYTE [DISTWIDTH] IN BLOOD BY AUTOMATED COUNT: 12.5 % (ref 11.5–14.5)
FERRITIN SERPL-MCNC: 588 NG/ML (ref 8–388)
FLUAV RNA RESP QL NAA+PROBE: NEGATIVE
FLUBV RNA RESP QL NAA+PROBE: NEGATIVE
GFR SERPL CREATININE-BSD FRML MDRD: 100 ML/MIN/1.73SQ M
GLUCOSE SERPL-MCNC: 214 MG/DL (ref 65–140)
GLUCOSE SERPL-MCNC: 252 MG/DL (ref 65–140)
GLUCOSE SERPL-MCNC: 256 MG/DL (ref 65–99)
HCT VFR BLD AUTO: 43.4 % (ref 42–47)
HGB BLD-MCNC: 15.3 G/DL (ref 12–16)
LYMPHOCYTES # BLD AUTO: 0.8 THOUSANDS/ΜL (ref 0.6–4.47)
LYMPHOCYTES NFR BLD AUTO: 16 % (ref 21–51)
MAGNESIUM SERPL-MCNC: 2.3 MG/DL (ref 1.9–2.7)
MCH RBC QN AUTO: 31.8 PG (ref 26–34)
MCHC RBC AUTO-ENTMCNC: 35.1 G/DL (ref 31–37)
MCV RBC AUTO: 91 FL (ref 81–99)
MONOCYTES # BLD AUTO: 0.2 THOUSAND/ΜL (ref 0.17–1.22)
MONOCYTES NFR BLD AUTO: 5 % (ref 2–12)
NEUTROPHILS # BLD AUTO: 3.9 THOUSANDS/ΜL (ref 1.4–6.5)
NEUTS SEG NFR BLD AUTO: 79 % (ref 42–75)
PLATELET # BLD AUTO: 149 THOUSANDS/UL (ref 149–390)
PMV BLD AUTO: 9.2 FL (ref 8.6–11.7)
POTASSIUM SERPL-SCNC: 4.2 MMOL/L (ref 3.5–5.5)
PROT SERPL-MCNC: 6.4 G/DL (ref 6.4–8.9)
RBC # BLD AUTO: 4.8 MILLION/UL (ref 3.9–5.2)
RSV RNA RESP QL NAA+PROBE: NEGATIVE
SARS-COV-2 RNA RESP QL NAA+PROBE: NEGATIVE
SODIUM SERPL-SCNC: 138 MMOL/L (ref 134–143)
WBC # BLD AUTO: 5 THOUSAND/UL (ref 4.8–10.8)

## 2020-11-28 PROCEDURE — 0241U HB NFCT DS VIR RESP RNA 4 TRGT: CPT | Performed by: HOSPITALIST

## 2020-11-28 PROCEDURE — 99239 HOSP IP/OBS DSCHRG MGMT >30: CPT | Performed by: HOSPITALIST

## 2020-11-28 PROCEDURE — 85025 COMPLETE CBC W/AUTO DIFF WBC: CPT | Performed by: STUDENT IN AN ORGANIZED HEALTH CARE EDUCATION/TRAINING PROGRAM

## 2020-11-28 PROCEDURE — 82948 REAGENT STRIP/BLOOD GLUCOSE: CPT

## 2020-11-28 PROCEDURE — 82728 ASSAY OF FERRITIN: CPT | Performed by: STUDENT IN AN ORGANIZED HEALTH CARE EDUCATION/TRAINING PROGRAM

## 2020-11-28 PROCEDURE — 83735 ASSAY OF MAGNESIUM: CPT | Performed by: STUDENT IN AN ORGANIZED HEALTH CARE EDUCATION/TRAINING PROGRAM

## 2020-11-28 PROCEDURE — 86140 C-REACTIVE PROTEIN: CPT | Performed by: STUDENT IN AN ORGANIZED HEALTH CARE EDUCATION/TRAINING PROGRAM

## 2020-11-28 PROCEDURE — 80053 COMPREHEN METABOLIC PANEL: CPT | Performed by: STUDENT IN AN ORGANIZED HEALTH CARE EDUCATION/TRAINING PROGRAM

## 2020-11-28 RX ORDER — ACETAMINOPHEN 325 MG/1
650 TABLET ORAL EVERY 6 HOURS PRN
Refills: 0
Start: 2020-11-28

## 2020-11-28 RX ORDER — AMOXICILLIN AND CLAVULANATE POTASSIUM 875; 125 MG/1; MG/1
1 TABLET, FILM COATED ORAL EVERY 12 HOURS SCHEDULED
Qty: 14 TABLET | Refills: 0 | Status: SHIPPED | OUTPATIENT
Start: 2020-11-28 | End: 2020-12-05

## 2020-11-28 RX ADMIN — INSULIN LISPRO 1 UNITS: 100 INJECTION, SOLUTION INTRAVENOUS; SUBCUTANEOUS at 10:28

## 2020-11-28 RX ADMIN — FAMOTIDINE 20 MG: 10 INJECTION, SOLUTION INTRAVENOUS at 10:27

## 2020-11-28 RX ADMIN — ZINC SULFATE 220 MG (50 MG) CAPSULE 220 MG: CAPSULE at 10:26

## 2020-11-28 RX ADMIN — REMDESIVIR 100 MG: 100 INJECTION, POWDER, LYOPHILIZED, FOR SOLUTION INTRAVENOUS at 11:42

## 2020-11-28 RX ADMIN — OXYCODONE HYDROCHLORIDE AND ACETAMINOPHEN 1000 MG: 500 TABLET ORAL at 10:26

## 2020-11-28 RX ADMIN — ENOXAPARIN SODIUM 30 MG: 30 INJECTION SUBCUTANEOUS at 10:26

## 2020-11-28 RX ADMIN — DEXAMETHASONE SODIUM PHOSPHATE 6 MG: 4 INJECTION, SOLUTION INTRA-ARTICULAR; INTRALESIONAL; INTRAMUSCULAR; INTRAVENOUS; SOFT TISSUE at 00:11

## 2020-11-28 RX ADMIN — CEFEPIME HYDROCHLORIDE 2000 MG: 2 INJECTION, SOLUTION INTRAVENOUS at 10:27

## 2020-11-28 RX ADMIN — AZITHROMYCIN MONOHYDRATE 500 MG: 500 INJECTION, POWDER, LYOPHILIZED, FOR SOLUTION INTRAVENOUS at 00:11

## 2020-11-28 RX ADMIN — INSULIN LISPRO 2 UNITS: 100 INJECTION, SOLUTION INTRAVENOUS; SUBCUTANEOUS at 11:34

## 2020-11-28 RX ADMIN — Medication 2000 UNITS: at 10:26

## 2020-11-28 NOTE — DISCHARGE INSTRUCTIONS
Pneumonia   WHAT YOU NEED TO KNOW:   Pneumonia is an infection in your lungs caused by bacteria, viruses, fungi, or parasites  You can become infected if you come in contact with someone who is sick  You can get pneumonia if you recently had surgery or needed a ventilator to help you breathe  Pneumonia can also be caused by accidentally inhaling saliva or small pieces of food  Pneumonia may cause mild symptoms, or it can be severe and life-threatening  DISCHARGE INSTRUCTIONS:   Seek care immediately if:   · You cough up blood  · Your heart beats more than 100 beats in 1 minute  · You are very tired, confused, and cannot think clearly  · You have chest pain or trouble breathing  · Your lips or fingernails turn gray or blue  Call your doctor if:   · Your symptoms are the same or get worse 48 hours after you start antibiotics  · Your fever is not below 99°F (37 2°C) 48 hours after you start antibiotics  · You have a fever higher than 101°F (38 3°C)  · You cannot eat, or you have loss of appetite, nausea, or are vomiting  · You have questions or concerns about your condition or care  Medicines: You may need any of the following:  · Antibiotics  treat pneumonia caused by bacteria  · Acetaminophen  decreases pain and fever  It is available without a doctor's order  Ask how much to take and how often to take it  Follow directions  Read the labels of all other medicines you are using to see if they also contain acetaminophen, or ask your doctor or pharmacist  Acetaminophen can cause liver damage if not taken correctly  Do not use more than 4 grams (4,000 milligrams) total of acetaminophen in one day  · NSAIDs , such as ibuprofen, help decrease swelling, pain, and fever  This medicine is available with or without a doctor's order  NSAIDs can cause stomach bleeding or kidney problems in certain people   If you take blood thinner medicine, always ask your healthcare provider if NSAIDs are safe for you  Always read the medicine label and follow directions  · Take your medicine as directed  Contact your healthcare provider if you think your medicine is not helping or if you have side effects  Tell him or her if you are allergic to any medicine  Keep a list of the medicines, vitamins, and herbs you take  Include the amounts, and when and why you take them  Bring the list or the pill bottles to follow-up visits  Carry your medicine list with you in case of an emergency  Follow up with your doctor as directed: You will need to return for more tests  Write down your questions so you remember to ask them during your visits  Manage your symptoms:   · Rest as needed  Rest often throughout the day  Alternate times of activity with times of rest     · Drink liquids as directed  Ask how much liquid to drink each day and which liquids are best for you  Liquids help thin your mucus, which may make it easier for you to cough it up  · Do not smoke  Avoid secondhand smoke  Smoking increases your risk for pneumonia  Smoking also makes it harder for you to get better after you have had pneumonia  Ask your healthcare provider for information if you need help to quit smoking  · Limit alcohol  Women should limit alcohol to 1 drink a day  Men should limit alcohol to 2 drinks a day  A drink of alcohol is 12 ounces of beer, 5 ounces of wine, or 1½ ounces of liquor  · Use a cool mist humidifier  A humidifier will help increase air moisture in your home  This may make it easier for you to breathe and help decrease your cough  · Keep your head elevated  You may be able to breathe better if you lie down with the head of your bed up  Prevent pneumonia:   · Wash your hands often  Use soap and water every time you wash your hands  Rub your soapy hands together, lacing your fingers  Use the fingers of one hand to scrub under the nails of the other hand  Wash for at least 20 seconds   Rinse with warm, running water for several seconds  Then dry your hands with a clean towel or paper towel  Use hand  that contains alcohol if soap and water are not available  Do not touch your eyes, nose, or mouth without washing your hands first          · Cover a sneeze or cough  Use a tissue that covers your mouth and nose  Throw the tissue away in a trash can right away  Use the bend of your arm if a tissue is not available  Wash your hands well with soap and water or use a hand   Do not stand close to anyone who is sneezing or coughing  · Stay away from others until you are well  Do not go to work or other activities  Wait until your symptoms are gone or your healthcare provider says it is okay to return  · Ask about vaccines you may need  You may need a vaccine to help prevent pneumonia  Get an influenza (flu) vaccine every year as soon as recommended, usually in September or October  Flu viruses change, so it is important to get a yearly flu vaccine  © Copyright [address] Information is for End User's use only and may not be sold, redistributed or otherwise used for commercial purposes  All illustrations and images included in CareNotes® are the copyrighted property of A D A M , Inc  or [address] [place] [address]  The above information is an  only  It is not intended as medical advice for individual conditions or treatments  Talk to your doctor, nurse or pharmacist before following any medical regimen to see if it is safe and effective for you  Amoxicillin/Clavulanate Potassium (By mouth)   Amoxicillin (o-bzx-q-ALFREDA-in), Clavulanate Potassium (CYSP-ck-bh-odalis efg-RJQ-zw-um)  Treats infections  This medicine is a penicillin antibiotic  Brand Name(s): Augmentin, Augmentin ES-600, Augmentin XR   There may be other brand names for this medicine  When This Medicine Should Not Be Used: This medicine is not right for everyone   Do not use it if you had an allergic reaction to amoxicillin, clavulanate, or a similar antibiotic (penicillin or cephalosporin), or if you had liver problems caused by Augmentin®  How to Use This Medicine:   Liquid, Tablet, Chewable Tablet, Long Acting Tablet  · Your doctor will tell you how much medicine to use  Do not use more than directed  · Take this medicine with a snack or at the beginning of a meal to help prevent nausea  · Chewable tablets: Chew the tablet completely before you swallow it  · Measure the oral liquid medicine with a marked measuring spoon, oral syringe, or medicine cup  Shake the medicine well just before you measure each dose  Rinse the spoon or dropper after each use  · Swallow the extended-release tablet whole  Do not crush, break, or chew it  · Take all of the medicine in your prescription to clear up your infection, even if you feel better after the first few doses  · Missed dose: Take a dose as soon as you remember  If it is almost time for your next dose, wait until then and take a regular dose  Do not take extra medicine to make up for a missed dose  · Tablet, extended-release tablet, chewable tablet: Store at room temperature, away from heat, moisture, and direct light  · Oral liquid: Store in the refrigerator  Do not freeze  · Throw away any unused oral liquid after 10 days  Drugs and Foods to Avoid:   Ask your doctor or pharmacist before using any other medicine, including over-the-counter medicines, vitamins, and herbal products  · Some medicines can affect how this medicine works  Tell your doctor if you are taking a blood thinner (such as warfarin), allopurinol, or probenecid  Warnings While Using This Medicine:   · Tell your doctor if you are pregnant or breastfeeding, or if you have kidney disease, liver disease, or mononucleosis (mono)  · Birth control pills may not work as well while you are taking this medicine  Use another form of birth control to prevent pregnancy    · This medicine can cause diarrhea  Call your doctor if the diarrhea becomes severe, does not stop, or is bloody  Do not take any medicine to stop diarrhea until you have talked to your doctor  Diarrhea can occur 2 months or more after you stop taking this medicine  · Tell any doctor or dentist who treats you that you are using this medicine  This medicine may affect certain medical test results  · Call your doctor if your symptoms do not improve or if they get worse  · The chewable tablet and oral liquid contain phenylalanine  Talk to your doctor before you use this medicine if you have phenylketonuria (PKU)  · Keep all medicine out of the reach of children  Never share your medicine with anyone  Possible Side Effects While Using This Medicine:   Call your doctor right away if you notice any of these side effects:  · Allergic reaction: Itching or hives, swelling in your face or hands, swelling or tingling in your mouth or throat, chest tightness, trouble breathing  · Blistering, peeling, red skin rash  · Change in how much or how often you urinate  · Dark urine or pale stools, nausea, vomiting, loss of appetite, stomach pain, yellow eyes or skin  · Diarrhea that may contain blood, stomach cramps  If you notice these less serious side effects, talk with your doctor:   · Diaper rash  · Mild diarrhea, nausea, vomiting  · Tooth discoloration (in children)  If you notice other side effects that you think are caused by this medicine, tell your doctor  Call your doctor for medical advice about side effects  You may report side effects to FDA at 9-352-FDA-1486  © Copyright Bear Suraj Information is for End User's use only and may not be sold, redistributed or otherwise used for commercial purposes  The above information is an  only  It is not intended as medical advice for individual conditions or treatments   Talk to your doctor, nurse or pharmacist before following any medical regimen to see if it is safe and effective for you

## 2020-11-28 NOTE — NURSING NOTE
Patient medically cleared to be DC home today, IV removed, DC instructions reviewed and given to patient  Patient instructed to follow up with all MD appointment's, continue to take all med's as prescribed,Patient provided the following education on all home med's: use, how to take, what to do about miss doses, foods and med's to avoid, and side effects  Patient educated on infection prevention: proper hand hygiene, cover cough and sneeze into upper arm, avoid any one who is sick, stay home if sick and wear mask when out in public, pt verbalized understanding

## 2020-11-28 NOTE — ASSESSMENT & PLAN NOTE
· CT chest: Multifocal pneumonia  Enlarged pulmonary artery suggesting pulmonary hypertension  No evidence of pulmonary embolism  · was on Cefepime, d/c on 7 further days of Augmentin  · Procal 0 43 on 11/26/20     · Note, pt with NEG COVID PCR on 11/25/20, 11/26/20, and 11/28/20

## 2020-11-28 NOTE — PROGRESS NOTES
Progress Note - Ana Armijo 1970, 44 y o  female MRN: [id number]    Unit/Bed#: -01 Encounter: [id number]    Primary Care Provider: No primary care provider on file  Date and time admitted to hospital: 11/25/2020  7:12 PM        Pulmonary hypertension ([address] )  Assessment & Plan  · Noted on CT imaging, patient's daughter reports that she has been having productive cough and dyspnea on exertion for the last several months  · Will need outpatient pulmonology follow-up    Hyperglycemia  Assessment & Plan  · Hyperglycemic, no history of diabetes  ·  A1c P,  · SSI/accuchecks/diabetic diet    SIRS due to infectious process with organ dysfunction [facility])  Assessment & Plan  · see plan for multifocal pneumonia    COVID-19 determined by clinical diagnostic criteria  Assessment & Plan  · See plan for multifocal pneumonia  · Remdesivir started 11/27/20  · Cont Decadron/Lovenox  · Note, pt with 2 NEG COVID tests (11/25/20 and 11/26/20)  These tests are very sensitive and specific and at this point time I do not believe the patient has COVID pneumonia  I will send a third COVID test today  Regardless, the pt is saturating well on RA and clinically feels improved  * Pneumonia of both lower lobes due to infectious organism  Assessment & Plan  · CT chest: Multifocal pneumonia  Enlarged pulmonary artery suggesting pulmonary hypertension  No evidence of pulmonary embolism  · Currently on Cefepime  · Procal 0 43 on 11/26/20  Recheck Procal today  · Also recheck COVID PCR      VTE Pharmacologic Prophylaxis:   Pharmacologic: Enoxaparin (Lovenox)  Mechanical VTE Prophylaxis in Place: Yes    Patient Centered Rounds: I have performed bedside rounds with nursing staff today  Time Spent for Care: 20 minutes  More than 50% of total time spent on counseling and coordination of care as described above      Current Length of Stay: 3 day(s)    Current Patient Status: Inpatient   Certification Statement: The patient will continue to require additional inpatient hospital stay due to PNA    Discharge Plan: today or tomorrow    Code Status: Level 1 - Full Code      Subjective:   Patient reports shortness of breath has greatly improved  Objective:     Vitals:   Temp (24hrs), Av 8 °F (36 6 °C), Min:97 5 °F (36 4 °C), Max:98 1 °F (36 7 °C)    Temp:  [97 5 °F (36 4 °C)-98 1 °F (36 7 °C)] 98 1 °F (36 7 °C)  HR:  [63-73] 63  Resp:  [18-20] 18  BP: (126-155)/(71-79) 148/72  SpO2:  [91 %-95 %] 93 %  Body mass index is 28 98 kg/m²  Input and Output Summary (last 24 hours):        Intake/Output Summary (Last 24 hours) at 2020 0836  Last data filed at 2020 2208  Gross per 24 hour   Intake 120 ml   Output 200 ml   Net -80 ml       Physical Exam:     Physical Exam  Gen: NAD, AAOx3, well developed, well nourished  Eyes: EOMI, PERRLA, no scleral icterus  ENMT: no nasal discharge, no otic discharge, moist mucous membranes  Neck:  Supple  Cardiovascular:  Regular rate and rhythm, normal S1-S2, no murmurs, rubs, or gallops  Lungs:  Clear to auscultation bilaterally, no wheezes, or rales, or rhonchi  Abdomen:  Positive bowel sounds, soft, nontender, nondistended, no palpable organomegaly   Skin:  Intact, no obvious lesions or rashes, no edema  Neuro: Cranial nerves 2-12 are intact, non-focal, 5/5 strength in all 4 extremities      Additional Data:     Labs:    Results from last 7 days   Lab Units 20  0632   WBC Thousand/uL 5 00   HEMOGLOBIN g/dL 15 3   HEMATOCRIT % 43 4   PLATELETS Thousands/uL 149   NEUTROS PCT % 79*   LYMPHS PCT % 16*   MONOS PCT % 5   EOS PCT % 0     Results from last 7 days   Lab Units 20  0632   SODIUM mmol/L 138   POTASSIUM mmol/L 4 2   CHLORIDE mmol/L 108*   CO2 mmol/L 22   BUN mg/dL 16   CREATININE mg/dL 0 69   ANION GAP mmol/L 8   CALCIUM mg/dL 8 4*   ALBUMIN g/dL 3 4*   TOTAL BILIRUBIN mg/dL 0 60   ALK PHOS U/L 62   ALT U/L 29   AST U/L 27   GLUCOSE RANDOM mg/dL 256*     Results from last 7 days   Lab Units 11/25/20 2011   INR  0 98     Results from last 7 days   Lab Units 11/28/20  0607 11/27/20 2029 11/27/20  1557 11/27/20  1154   POC GLUCOSE mg/dl 214* 168* 187* 238*         Results from last 7 days   Lab Units 11/26/20  0547 11/25/20 2011   LACTIC ACID mmol/L  --  0 9   PROCALCITONIN ng/ml 0 43* 0 22           * I Have Reviewed All Lab Data Listed Above  * Additional Pertinent Lab Tests Reviewed: [address] Admission Reviewed    Recent Cultures (last 7 days):     Results from last 7 days   Lab Units 11/27/20 2039 11/26/20  0123 11/25/20 2011   BLOOD CULTURE   --   --  No Growth at 48 hrs  No Growth at 48 hrs     LEGIONELLA URINARY ANTIGEN   --  Negative  --    C DIFF TOXIN B BY PCR  Negative  --   --        Last 24 Hours Medication List:   Current Facility-Administered Medications   Medication Dose Route Frequency Provider Last Rate    acetaminophen  650 mg Oral Q6H PRN APOLONIA Deutsch      ascorbic acid  1,000 mg Oral Q12H [facility] APOLONIA Deutsch      azithromycin  500 mg Intravenous Q24H Hi Perez PA-C 500 mg (11/28/20 0011)    cefepime  2,000 mg Intravenous Q12H Juvenal Ng MD 2,000 mg (11/27/20 2256)    cholecalciferol  2,000 Units Oral Daily APOLONIA Deutsch      dexamethasone  6 mg Intravenous Q24H APOLONIA Deutsch      dextromethorphan-guaiFENesin  10 mL Oral Q4H PRN Juvenal Ng MD      enoxaparin  30 mg Subcutaneous Q12H [facility] APOLONIA Deutsch      famotidine  20 mg Intravenous BID APOLONIA Deutsch      insulin lispro  1-5 Units Subcutaneous TID [facility] Juvenal Ng MD      insulin lispro  1-5 Units Subcutaneous HS Juvenal Ng MD      zinc sulfate  220 mg Oral Daily APOLONIA Deutsch      Followed by   Gaby Bender ON 12/3/2020] multivitamin-minerals  1 tablet Oral Daily APOLONIA Deutsch      ondansetron  4 mg Intravenous Q6H PRN APOLONIA Stahl      remdesivir  100 mg Intravenous Q24H Sanford Grandchild, MD          Today, Patient Was Seen By: Cr Goncalves MD    ** Please Note: Dictation voice to text software may have been used in the creation of this document   **

## 2020-11-28 NOTE — ASSESSMENT & PLAN NOTE
· See plan for multifocal pneumonia  · Remdesivir started 11/27/20  · Cont Decadron/Lovenox  · Note, pt with 2 NEG COVID tests (11/25/20 and 11/26/20)  These tests are very sensitive and specific and at this point time I do not believe the patient has COVID pneumonia  I will send a third COVID test today  Regardless, the pt is saturating well on RA and clinically feels improved

## 2020-11-28 NOTE — ASSESSMENT & PLAN NOTE
· CT chest: Multifocal pneumonia  Enlarged pulmonary artery suggesting pulmonary hypertension  No evidence of pulmonary embolism  · Currently on Cefepime  · Procal 0 43 on 11/26/20  Recheck Procal today     · Also recheck COVID PCR

## 2020-11-28 NOTE — DISCHARGE SUMMARY
Discharge- Selam Little 1970, 44 y o  female MRN: [id number]    Unit/Bed#: -01 Encounter: [id number]    Primary Care Provider: No primary care provider on file  Date and time admitted to hospital: 11/25/2020  7:12 PM        Pulmonary hypertension ([address] )  Assessment & Plan  · Noted on CT imaging, patient's daughter reports that she has been having productive cough and dyspnea on exertion for the last several months  · Will need outpatient pulmonology follow-up    Hyperglycemia  Assessment & Plan  · Hyperglycemic, no history of diabetes  · A1c P, follow up after d/c  · SSI/accuchecks/diabetic diet    SIRS due to infectious process with organ dysfunction [facility])  Assessment & Plan  · see plan for multifocal pneumonia    * Pneumonia of both lower lobes due to infectious organism  Assessment & Plan  · CT chest: Multifocal pneumonia  Enlarged pulmonary artery suggesting pulmonary hypertension  No evidence of pulmonary embolism  · was on Cefepime, d/c on 7 further days of Augmentin  · Procal 0 43 on 11/26/20  · Note, pt with NEG COVID PCR on 11/25/20, 11/26/20, and 11/28/20      Discharging Physician / Practitioner: Garen Nyhan, MD  PCP: No primary care provider on file  Admission Date:   Admission Orders (From admission, onward)     Ordered        11/25/20 2324  Inpatient Admission  Once                   Discharge Date: 11/28/20    Resolved Problems  Date Reviewed: 11/28/2020          Resolved    COVID-19 determined by clinical diagnostic criteria 11/28/2020     Resolved by  Garen Nyhan, MD          Consultations During Hospital Stay:  · None    Procedures Performed:   · None    Significant Findings / Test Results:   · See above    Incidental Findings:   · None     Test Results Pending at Discharge (will require follow up):    · Hemoglobin A1c     Outpatient Tests Requested:  · None    Complications:  None    Reason for Admission:  Pneumonia    Hospital Course:     Selam Little is a 44 y o  female patient who originally presented to the hospital on 11/25/2020 due to pneumonia as outlined in the H&P done on admission  Please see above list of diagnoses and related plan for additional information  Condition at Discharge: good     Discharge Day Visit / Exam:     * Please refer to separate progress note for these details *    Discharge instructions/Information to patient and family:   See after visit summary for information provided to patient and family  Provisions for Follow-Up Care:  See after visit summary for information related to follow-up care and any pertinent home health orders  Disposition:     Home    Planned Readmission: None     Discharge Statement:  I spent 35 minutes discharging the patient  This time was spent on the day of discharge  I had direct contact with the patient on the day of discharge  Greater than 50% of the total time was spent examining patient, answering all patient questions, arranging and discussing plan of care with patient as well as directly providing post-discharge instructions  Additional time then spent on discharge activities  Discharge Medications:  See after visit summary for reconciled discharge medications provided to patient and family        ** Please Note: This note has been constructed using a voice recognition system **

## 2020-11-28 NOTE — ASSESSMENT & PLAN NOTE
· Hyperglycemic, no history of diabetes  · A1c P, follow up after d/c  · SSI/accuchecks/diabetic diet

## 2020-11-28 NOTE — ASSESSMENT & PLAN NOTE
· Noted on CT imaging, patient's daughter reports that she has been having productive cough and dyspnea on exertion for the last several months  · Will need outpatient pulmonology follow-up

## 2020-11-29 LAB
CAMPYLOBACTER DNA SPEC NAA+PROBE: NORMAL
SALMONELLA DNA SPEC QL NAA+PROBE: NORMAL
SHIGA TOXIN STX GENE SPEC NAA+PROBE: NORMAL
SHIGELLA DNA SPEC QL NAA+PROBE: NORMAL

## 2020-11-30 NOTE — UTILIZATION REVIEW
Notification of Discharge  This is a Notification of Discharge from our facility [address]  Please be advised that this patient has been discharge from our facility  Below you will find the admission and discharge date and time including the patients disposition  PRESENTATION DATE: 11/25/2020  7:12 PM  OBS ADMISSION DATE:   IP ADMISSION DATE: 11/25/20 2324   DISCHARGE DATE: 11/28/2020  4:21 PM  DISPOSITION: Home/Self Care Home/Self Care   Admission Orders listed below:  Admission Orders (From admission, onward)     Ordered        11/25/20 2324  Inpatient Admission  Once                   Please contact the UR Department if additional information is required to close this patient's authorization/case  Mary Code  Network Utilization Review Department  Main: [phone] x carefully listen to the prompts  All voicemails are confidential   [email or web address]  Send all requests for admission clinical reviews, approved or denied determinations and any other requests to dedicated fax number below belonging to the campus where the patient is receiving treatment   List of dedicated fax numbers:  [address] DENIALS (Administrative/Medical Necessity) [phone]   [address] (Maternity/NICU/Pediatrics) [phone]   Edwin Mcintosh [phone]   [facility] [phone]   [facility]  [phone]   [facility] [address] [place] [address] [phone]   [facility]  [phone]   [address] [facility]  [address] [facility] [address] [facility] [phone]

## 2020-12-01 LAB
BACTERIA BLD CULT: NORMAL
BACTERIA BLD CULT: NORMAL

## 2021-01-26 ENCOUNTER — OFFICE VISIT (OUTPATIENT)
Dept: PULMONOLOGY | Facility: CLINIC | Age: 51
End: 2021-01-26
Payer: COMMERCIAL

## 2021-01-26 VITALS
OXYGEN SATURATION: 97 % | TEMPERATURE: 98.5 F | HEIGHT: 65 IN | WEIGHT: 171.8 LBS | SYSTOLIC BLOOD PRESSURE: 122 MMHG | BODY MASS INDEX: 28.62 KG/M2 | DIASTOLIC BLOOD PRESSURE: 68 MMHG | RESPIRATION RATE: 18 BRPM | HEART RATE: 76 BPM

## 2021-01-26 DIAGNOSIS — R05.3 CHRONIC COUGH: ICD-10-CM

## 2021-01-26 DIAGNOSIS — K21.9 GASTROESOPHAGEAL REFLUX DISEASE, UNSPECIFIED WHETHER ESOPHAGITIS PRESENT: ICD-10-CM

## 2021-01-26 DIAGNOSIS — R05.3 CHRONIC COUGH: Primary | ICD-10-CM

## 2021-01-26 DIAGNOSIS — I27.20 PULMONARY HYPERTENSION (HCC): ICD-10-CM

## 2021-01-26 PROCEDURE — 99215 OFFICE O/P EST HI 40 MIN: CPT | Performed by: INTERNAL MEDICINE

## 2021-01-26 RX ORDER — GUAIFENESIN 600 MG
1200 TABLET, EXTENDED RELEASE 12 HR ORAL EVERY 12 HOURS SCHEDULED
Qty: 30 TABLET | Refills: 11 | Status: SHIPPED | OUTPATIENT
Start: 2021-01-26

## 2021-01-26 RX ORDER — BENZONATATE 200 MG/1
CAPSULE ORAL
Qty: 90 CAPSULE | Refills: 1 | Status: SHIPPED | OUTPATIENT
Start: 2021-01-26

## 2021-01-26 RX ORDER — BENZONATATE 200 MG/1
200 CAPSULE ORAL 3 TIMES DAILY PRN
Qty: 257.14 EACH | Refills: 0 | Status: SHIPPED | OUTPATIENT
Start: 2021-01-26 | End: 2021-01-26

## 2021-01-26 RX ORDER — FLUTICASONE PROPIONATE 50 MCG
2 SPRAY, SUSPENSION (ML) NASAL DAILY
Qty: 1 BOTTLE | Refills: 5 | Status: SHIPPED | OUTPATIENT
Start: 2021-01-26

## 2021-01-26 RX ORDER — OMEPRAZOLE 20 MG/1
20 TABLET, DELAYED RELEASE ORAL DAILY
Qty: 30 TABLET | Refills: 11 | Status: SHIPPED | OUTPATIENT
Start: 2021-01-26

## 2021-01-26 RX ORDER — NAPROXEN SODIUM 220 MG
220 TABLET ORAL
COMMUNITY

## 2021-01-26 RX ORDER — ALBUTEROL SULFATE 90 UG/1
2 AEROSOL, METERED RESPIRATORY (INHALATION) EVERY 6 HOURS PRN
Qty: 6.7 G | Refills: 11 | Status: SHIPPED | OUTPATIENT
Start: 2021-01-26

## 2021-01-26 NOTE — PROGRESS NOTES
Pulmonary Follow Up Note   Ish Carlos 44 y o  female MRN: [id number]  1/26/2021    Assessment:  Chronic cough  · Unclear etiology at this point, possibly related to chronic reflux reports symptoms of reflux but not on any medicines, previously failed treatment with famotidine  · Probably contribution from postnasal drip versus post viral/pneumonia cough    Plan:  · Will start cough suppressant Tessalon, and Mucinex  · Given the wheezes, will give a trial of short-acting bronchodilator with albuterol  · Will check complete pulmonary function tests with bronchodilator response  · Check 2 views chest x-ray given the recent pneumonia/document resolution, will consider CT chest if still showing abnormality  · Start PPI with omeprazole 20 mg daily, given instructions about anti-reflux measures  Avoid eating heavy meals, lived 2 hours from the last meal and bedtime      Dyspnea on exertion  · Noted enlarged pulmonary artery on the CT chest, no pulmonary embolism  · No prior history of pharmacologic disorder or cardiac/pulmonary disease  · No history of of drug abuse/weight loss drugs intake  · Will check complete TTE for elevated PA pressures/RV pathology  · Pulmonary function tests    Acid reflux  · Educated about anti-reflux measures  · Start omeprazole 20 mg daily, explained to her that this has to be for at least 8 weeks    Return in about 2 months (around 3/26/2021)  History of Present Illness   61-year-old female was recently admitted on 11/25/2020 with multifocal pneumonia, treated with cefepime and discharged on 7 days course of Augmentin  Imaging showed enlarged pulmonary artery suggesting pulmonary hypertension, no pulmonary embolism    Patient presented today for evaluation by Pulmonary for dyspnea on exertion and chronic cough      Chronic cough:  She states that it started approximately 3 years ago, noted that it triggers with activity, eating/drinking or exposure to chemicals/DIS infecting, sometimes worse with laying flat or when there is a reflux symptoms  Improves with drinking water  Described it as mainly dry cough, sometimes minimally productive of thick/white sputum  Mucinex did not suppress it but made it easier to expect weight  Has not changed since 3 years  Sometimes associated with wheezing at night  Recently treated for viral/bacterial multifocal pneumonia as an inpatient with 7 days course of Augmentin  Procalcitonin was elevated at 0 43, negative COVID-19 PCR x3  Negative Legionella and strep pneumonia antigens  Denies postnasal drip or continuous rhinorrhea no change in voice  No change in cough in presence at work or at home  Dyspnea on exertion:  Noted over the years with mild dyspnea at the end of exercise like going up stairs more than 2 flights of stairs, baseline able to go up to 3 flights of stair without symptoms  Not associated with angina can trigger the cough  No dizziness, syncope or presyncope  No prior history of cardiac/pulmonary disorders  CT chest showed enlarged pulmonary artery  No prior echocardiography, no history of CAD, or pulmonary hypertension  No personal or family history of rheumatologic disorders  Never been on any weight loss drugs  Review of Systems  As per hpi, all other systems reviewed and were negativ    Past medical history  Varicose veins  Recent multifocal pneumonia    Past surgical history  None    Social history  [place] raised around [place] area  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 chemicals, disinfectants  Lifelong nonsmoker, nonalcoholic, no drug abuse  Lives with her  and children, has an unfinished basement but does not go there  No leaking roofs or malfunctioning heating/AC systems  Medications/Allergies: Reviewed      Vitals: Blood pressure 122/68, pulse 76, temperature 98 5 °F (36 9 °C), resp   rate 18, height 5' 5" (1 651 m), weight 77 9 kg (171 lb 12 8 oz), SpO2 97 %  Body mass index is 28 59 kg/m²  Oxygen Therapy  SpO2: 97 %  Oxygen Therapy: None (Room air)      Physical Exam  Body mass index is 28 59 kg/m²  Gen: not in acute distress  HEENT: supple, no adenopathy, PERRLA  Chest: normal respiratory efforts, clear breath sounds bilaterally  CV: RRR, no murmurs appreciated  Abdomen: soft, non tender, normal bowel sounds  Extremities: no edema  Neuro: AAO X3, no focal motor deficit      Labs:  Lab Results   Component Value Date    WBC 5 00 11/28/2020    HGB 15 3 11/28/2020    HCT 43 4 11/28/2020    MCV 91 11/28/2020     11/28/2020     Lab Results   Component Value Date    CALCIUM 8 4 (L) 11/28/2020    K 4 2 11/28/2020    CO2 22 11/28/2020     (H) 11/28/2020    BUN 16 11/28/2020    CREATININE 0 69 11/28/2020     No results found for: IGE  Lab Results   Component Value Date    ALT 29 11/28/2020    AST 27 11/28/2020    ALKPHOS 62 11/28/2020         Imaging and other studies: I have personally reviewed pertinent films in PACS      Pulmonary function testing:       EKG, Pathology, and Other Studies: I have personally reviewed pertinent reports  Portions of the record may have been created with voice recognition software  Occasional wrong word or "sound a like" substitutions may have occurred due to the inherent limitations of voice recognition software  Read the chart carefully and recognize, using context, where substitutions have occurred    BECKY Jones [facility]

## 2024-08-18 NOTE — CASE MANAGEMENT
Chart reviewed by cm, I did kamilah the pt's room at 10:50 to # [phone] and she was do feeling up to answering questions and she gave me permission to call her , pt cho not have a pcp, I told her I could help her fin a doctor if she would like me to help , I made her aware that the rn will deliver an infor link card that can help her to find a pcp if she would like to do it herself and the rn will also give her my card so she can call me if she needs to talk with me,  I did place a call to Brant [place] at 10:54 to # [phone] and he was able to help me complete the assessment, pt lives with her spouse [place] ,12 basement steps and she does not need to use, 3 step outside, no poa, no DME : none, [address] [place], pt drives and is independent, no smoking and alcohol on occasion, pt;s  will transport when stable , pt remains on remdesivir, cultures pending, pt remains on oxygen and she does have home oxygen ,pt not stable for d/c when stable her  will transport, cm will continue to follow and assess for an additional d/c needs,CM reviewed d/c planning process including the following: identifying help at home, patient preference for d/c planning needs, availability of treatment team to discuss questions or concerns patient and/or family may have regarding understanding medications and recognizing signs and symptoms once discharged  CM also encouraged patient to follow up with all recommended appointments after discharge  Patient advised of importance for patient and family to participate in managing patients medical well being 
75 feet